# Patient Record
Sex: MALE | Race: WHITE | Employment: FULL TIME | URBAN - METROPOLITAN AREA
[De-identification: names, ages, dates, MRNs, and addresses within clinical notes are randomized per-mention and may not be internally consistent; named-entity substitution may affect disease eponyms.]

---

## 2018-08-28 ENCOUNTER — OFFICE VISIT (OUTPATIENT)
Dept: CARDIOLOGY CLINIC | Age: 58
End: 2018-08-28

## 2018-08-28 VITALS
HEART RATE: 60 BPM | HEIGHT: 67 IN | SYSTOLIC BLOOD PRESSURE: 106 MMHG | WEIGHT: 157 LBS | DIASTOLIC BLOOD PRESSURE: 64 MMHG | BODY MASS INDEX: 24.64 KG/M2 | OXYGEN SATURATION: 97 %

## 2018-08-28 DIAGNOSIS — I10 ESSENTIAL HYPERTENSION: ICD-10-CM

## 2018-08-28 DIAGNOSIS — I25.10 CORONARY ARTERY DISEASE INVOLVING NATIVE CORONARY ARTERY OF NATIVE HEART WITHOUT ANGINA PECTORIS: ICD-10-CM

## 2018-08-28 DIAGNOSIS — E78.2 MIXED HYPERLIPIDEMIA: ICD-10-CM

## 2018-08-28 PROCEDURE — 99204 OFFICE O/P NEW MOD 45 MIN: CPT | Performed by: INTERNAL MEDICINE

## 2018-08-28 RX ORDER — AMLODIPINE BESYLATE 5 MG/1
5 TABLET ORAL DAILY
Qty: 30 TABLET | Refills: 11 | Status: SHIPPED | OUTPATIENT
Start: 2018-08-28 | End: 2019-09-05 | Stop reason: SDUPTHER

## 2018-08-28 RX ORDER — LISINOPRIL 10 MG/1
10 TABLET ORAL DAILY
COMMUNITY
End: 2018-08-28 | Stop reason: SDUPTHER

## 2018-08-28 RX ORDER — CARVEDILOL 3.12 MG/1
3.12 TABLET ORAL 2 TIMES DAILY WITH MEALS
COMMUNITY
End: 2018-08-28 | Stop reason: SINTOL

## 2018-08-28 RX ORDER — OMEPRAZOLE 10 MG/1
10 CAPSULE, DELAYED RELEASE ORAL DAILY
COMMUNITY
End: 2019-10-11

## 2018-08-28 RX ORDER — CLOPIDOGREL BISULFATE 75 MG/1
75 TABLET ORAL DAILY
Qty: 30 TABLET | Refills: 11 | Status: SHIPPED | OUTPATIENT
Start: 2018-08-28 | End: 2019-08-09 | Stop reason: SDUPTHER

## 2018-08-28 RX ORDER — CLOPIDOGREL BISULFATE 75 MG/1
75 TABLET ORAL DAILY
COMMUNITY
End: 2018-08-28 | Stop reason: SDUPTHER

## 2018-08-28 RX ORDER — NITROGLYCERIN 0.4 MG/1
0.4 TABLET SUBLINGUAL EVERY 5 MIN PRN
COMMUNITY
End: 2021-01-04 | Stop reason: SDUPTHER

## 2018-08-28 RX ORDER — ATORVASTATIN CALCIUM 80 MG/1
80 TABLET, FILM COATED ORAL DAILY
COMMUNITY
End: 2018-08-28 | Stop reason: SDUPTHER

## 2018-08-28 RX ORDER — ATORVASTATIN CALCIUM 80 MG/1
80 TABLET, FILM COATED ORAL DAILY
Qty: 30 TABLET | Refills: 11 | Status: SHIPPED | OUTPATIENT
Start: 2018-08-28 | End: 2019-09-23 | Stop reason: SDUPTHER

## 2018-08-28 RX ORDER — LISINOPRIL 10 MG/1
10 TABLET ORAL DAILY
Qty: 30 TABLET | Refills: 11 | Status: SHIPPED | OUTPATIENT
Start: 2018-08-28 | End: 2019-09-05 | Stop reason: SDUPTHER

## 2018-08-28 RX ORDER — AMLODIPINE BESYLATE 5 MG/1
5 TABLET ORAL DAILY
COMMUNITY
End: 2018-08-28 | Stop reason: SDUPTHER

## 2018-08-28 NOTE — PROGRESS NOTES
Hypertension. Surgical History:   has a past surgical history that includes Mandible fracture surgery; Shoulder arthroscopy; Foot surgery; Diagnostic Cardiac Cath Lab Procedure; and Percutaneous Transluminal Coronary Angio. Social History:   He is , works full time installing granite counter tops, lives with spouse. He reports that he quit smoking about 5 weeks ago. His smoking use included Cigarettes. He has a 43.00 pack-year smoking history. He has never used smokeless tobacco. He reports that he drinks alcohol. He reports that he does not use drugs. Family History:  Mom MI CABG age 62 Brother MI stents age 63's  family history includes Heart Attack in his brother and mother. Home Medications:  Prior to Admission medications    Medication Sig Start Date End Date Taking? Authorizing Provider   amLODIPine (NORVASC) 5 MG tablet Take 5 mg by mouth daily   Yes Historical Provider, MD   aspirin 81 MG tablet Take 81 mg by mouth daily   Yes Historical Provider, MD   atorvastatin (LIPITOR) 80 MG tablet Take 80 mg by mouth daily   Yes Historical Provider, MD   carvedilol (COREG) 3.125 MG tablet Take 3.125 mg by mouth 2 times daily (with meals)   Yes Historical Provider, MD   clopidogrel (PLAVIX) 75 MG tablet Take 75 mg by mouth daily   Yes Historical Provider, MD   lisinopril (PRINIVIL;ZESTRIL) 10 MG tablet Take 10 mg by mouth daily   Yes Historical Provider, MD   nitroGLYCERIN (NITROSTAT) 0.4 MG SL tablet Place 0.4 mg under the tongue every 5 minutes as needed for Chest pain up to max of 3 total doses. If no relief after 1 dose, call 911. Yes Historical Provider, MD   omeprazole (PRILOSEC) 10 MG delayed release capsule Take 10 mg by mouth daily   Yes Historical Provider, MD        Allergies:  Codeine     Review of Systems:   · Constitutional: there has been no unanticipated weight loss. There's been no change in energy level, sleep pattern, or activity level.      · Eyes: No visual changes or diplopia. No scleral icterus. · ENT: No Headaches, hearing loss or vertigo. No mouth sores or sore throat. · Cardiovascular: Reviewed in HPI  · Respiratory: No cough or wheezing, no sputum production. No hematemesis. · Gastrointestinal: No abdominal pain, appetite loss, blood in stools. No change in bowel or bladder habits. · Genitourinary: No dysuria, trouble voiding, or hematuria. · Musculoskeletal:  No gait disturbance, weakness or joint complaints. · Integumentary: No rash or pruritis. · Neurological: No headache, diplopia, change in muscle strength, numbness or tingling. No change in gait, balance, coordination, mood, affect, memory, mentation, behavior. · Psychiatric: No anxiety, no depression. · Endocrine: No malaise, fatigue or temperature intolerance. No excessive thirst, fluid intake, or urination. No tremor. · Hematologic/Lymphatic: No abnormal bruising or bleeding, blood clots or swollen lymph nodes. · Allergic/Immunologic: No nasal congestion or hives. Physical Examination:    Vitals:    08/28/18 1348   BP: 106/64   Pulse: 60   SpO2: 97%        Constitutional and General Appearance: NAD   Respiratory:  · Normal excursion and expansion without use of accessory muscles  · Resp Auscultation: Normal breath sounds without dullness  Cardiovascular:  · The apical impulses not displaced  · Heart tones are crisp and normal  · Cervical veins are not engorged  · The carotid upstroke is normal in amplitude and contour without delay or bruit  · Normal S1S2, No S3, No Murmur  · Peripheral pulses are symmetrical and full  · There is no clubbing, cyanosis of the extremities.   · No edema  · Femoral Arteries: 2+ and equal  · Pedal Pulses: 2+ and equal   Abdomen:  · No masses or tenderness  · Liver/Spleen: No Abnormalities Noted  Neurological/Psychiatric:  · Alert and oriented in all spheres  · Moves all extremities well  · Exhibits normal gait balance and coordination  · No abnormalities of mood, affect, memory, mentation, or behavior are noted  Skin:  · Skin: warm and dry. Assessment:     1. Coronary artery disease involving native coronary artery of native heart without angina pectoris:  Most recent  [facility] 7/24/18   2-vessel CAD with proximal RCA 50-60% stenosis and OM2 with mid-vessel 75% stenosis. Mild multifocal proximal to mid LAD 25-30% plaquing but not severe. He had a successful HESHAM stent to the mid-OM2. Most recent ECHO 7/24/18 EF  55% to 60%. Normal diastolic function. Most recent EKG 7/24/18 SB 49 bpm inferior Q wave cannot rule out prior infarct. Mild NSTEMI with peak troponin 0.05.      2. Essential hypertension Stable and will continue present medical regimen. 3. Mixed hyperlipidemia Last lipids 7/24/18   HDL 57 . Suboptimal and started on high dose lipitor. Will recheck lipids end of September and adjust accordingly. Plan:  1. Will stop Coreg due to excessive fatigue which may be side effect of medication. Ideally, would like to use beta blocker but may not be able to tolerate. He is to call me in 2 weeks if not feeling better  2. Recheck lipids end of September  3. Referral to cardiac rehab patient refuses he is unable to attend due to work schedule  4. Continue all meds as directed refill made  5. Follow up in 3 months  6. Increase OTC Prilosec to 40 mg daily for GERD symptoms. If not better let me know. Cost of prescription medications and patient compliance have been reviewed with patient. All questions answered. Thank you for allowing me to participate in the care of this individual.    Sherie Martinez.  Mo Franco M.D., [facility]

## 2018-08-28 NOTE — LETTER
[facility] [address]  [address] [place] [id number]  Phone: [phone]  Fax: [phone]    Marisel Mar MD        August 28, 2018     Patient: Portillo Mejia   YOB: 1960   Date of Visit: 8/28/2018       To Whom It May Concern:    Edman Boas [place] today [place]. Kevin [place] [facility] [place] excuse for time missed. If you have any questions or concerns, please don't hesitate to call.     Sincerely,        Marisel Mar MD

## 2018-08-28 NOTE — COMMUNICATION BODY
[facility]   [facility]    Referring Provider:  SHAN Santos - CNP     Chief Complaint   Patient presents with    Follow Up After Procedure     [facility]    Coronary Artery Disease    Results     ekg 07/24/2018, echo 07/23/2018 - [facility]    Discuss Labs     07/24/2018 - [facility]    Other     wrist healing    Chest Pain    Fatigue     really tired last wk      Subjective:  Patient being seen today for new patient cardiology evaluation for CAD recent [facility] 7/24/18 s/p 1 stent, HTN, HLD; c/o fatigue and heartburn today    History of Present Illness:  Mr. Tobias Scott is a 63 yr old male self-referred to establish cardiac care after having OM#2 HESHAM placed in July 2018. He has PMH  for hypertension, HLD, newly dx CAD s/p mild NSTEMI and stent, and tobacco use. Mr Tobias Scott was seen at [facility] states that he woke up with ringing in his ears and mid-sternal CP radiating into neck region. Drove to [facility] ER and reports nitroglycerin SL and aspirin and reports relief of his pain. Troponin peak 0.05. Most recent LHC by Dr. Josy Forte on 7/24/18 2V CAD with proximal RCA 50-60% stenosis and OM2 with mid-vessel 75% stenosis; proximal mid-LAD 25-30% plaquing but not severe. He had a successful HESHAM stent to the mid-OM2. Most recent ECHO 7/24/18 EF  55% to 60%. Normal diastolic function. Most recent EKG 7/24/18 shows SB 49 bpm inferior Q wave cannot rule out prior infarct   Today he reports to feeling better but is very fatigued, lethargic, and no energy. He describes mid-burning CP after eating with belching and is taking prilosec. It's different than pain before stent. He quit smoking after cath. Wife, who is patient of mine, accompanied him today. Patient with no complaints of SOB, palpitations, dizziness, edema, or orthopnea/PND. Past Medical History:   has a past medical history of CAD (coronary artery disease);  Hyperlipidemia; and diplopia. No scleral icterus. · ENT: No Headaches, hearing loss or vertigo. No mouth sores or sore throat. · Cardiovascular: Reviewed in HPI  · Respiratory: No cough or wheezing, no sputum production. No hematemesis. · Gastrointestinal: No abdominal pain, appetite loss, blood in stools. No change in bowel or bladder habits. · Genitourinary: No dysuria, trouble voiding, or hematuria. · Musculoskeletal:  No gait disturbance, weakness or joint complaints. · Integumentary: No rash or pruritis. · Neurological: No headache, diplopia, change in muscle strength, numbness or tingling. No change in gait, balance, coordination, mood, affect, memory, mentation, behavior. · Psychiatric: No anxiety, no depression. · Endocrine: No malaise, fatigue or temperature intolerance. No excessive thirst, fluid intake, or urination. No tremor. · Hematologic/Lymphatic: No abnormal bruising or bleeding, blood clots or swollen lymph nodes. · Allergic/Immunologic: No nasal congestion or hives. Physical Examination:    Vitals:    08/28/18 1348   BP: 106/64   Pulse: 60   SpO2: 97%        Constitutional and General Appearance: NAD   Respiratory:  · Normal excursion and expansion without use of accessory muscles  · Resp Auscultation: Normal breath sounds without dullness  Cardiovascular:  · The apical impulses not displaced  · Heart tones are crisp and normal  · Cervical veins are not engorged  · The carotid upstroke is normal in amplitude and contour without delay or bruit  · Normal S1S2, No S3, No Murmur  · Peripheral pulses are symmetrical and full  · There is no clubbing, cyanosis of the extremities.   · No edema  · Femoral Arteries: 2+ and equal  · Pedal Pulses: 2+ and equal   Abdomen:  · No masses or tenderness  · Liver/Spleen: No Abnormalities Noted  Neurological/Psychiatric:  · Alert and oriented in all spheres  · Moves all extremities well  · Exhibits normal gait balance and coordination  · No abnormalities of mood, affect,

## 2018-10-15 ENCOUNTER — TELEPHONE (OUTPATIENT)
Dept: CARDIOLOGY CLINIC | Age: 58
End: 2018-10-15

## 2018-10-15 NOTE — TELEPHONE ENCOUNTER
Pt would like to have dosing of lipitor changed from 80 mg to 40 mg. Explained to Carlita Casillas. Once pt has his BW done that M ordered om 8.28.18 (last visit) he would then adjust accordingly. She will let Jared Erickson know once he is back in town.

## 2018-10-22 ENCOUNTER — HOSPITAL ENCOUNTER (OUTPATIENT)
Age: 58
Discharge: HOME | End: 2018-10-22
Payer: COMMERCIAL

## 2018-10-22 DIAGNOSIS — E78.2 MIXED HYPERLIPIDEMIA: ICD-10-CM

## 2018-10-22 LAB
CHOLESTEROL, TOTAL: 194 MG/DL (ref 0–199)
HDLC SERPL-MCNC: 58 MG/DL (ref 40–60)
LDL CHOLESTEROL CALCULATED: 103 MG/DL
TRIGL SERPL-MCNC: 167 MG/DL (ref 0–150)
VLDLC SERPL CALC-MCNC: 33 MG/DL

## 2018-10-22 PROCEDURE — 36415 COLL VENOUS BLD VENIPUNCTURE: CPT

## 2018-10-22 PROCEDURE — 80061 LIPID PANEL: CPT

## 2018-10-23 ENCOUNTER — TELEPHONE (OUTPATIENT)
Dept: CARDIOLOGY CLINIC | Age: 58
End: 2018-10-23

## 2018-10-23 NOTE — TELEPHONE ENCOUNTER
----- Message from Steve Santos MD sent at 10/23/2018  7:50 AM EDT -----  Lipids are much improved overall. WFI=024 and want < 100 closer to 70. CPM and watch diet as closely as possible. At next recheck may switch to stronger statin drug depending on results.

## 2019-02-26 RX ORDER — OMEPRAZOLE 20 MG/1
20 CAPSULE, DELAYED RELEASE ORAL 2 TIMES DAILY
Qty: 180 CAPSULE | Refills: 2 | OUTPATIENT
Start: 2019-02-26

## 2019-08-09 ENCOUNTER — TELEPHONE (OUTPATIENT)
Dept: CARDIOLOGY CLINIC | Age: 59
End: 2019-08-09

## 2019-08-09 RX ORDER — CLOPIDOGREL BISULFATE 75 MG/1
75 TABLET ORAL DAILY
Qty: 30 TABLET | Refills: 1 | Status: SHIPPED | OUTPATIENT
Start: 2019-08-09 | End: 2019-10-11 | Stop reason: SDUPTHER

## 2019-08-09 NOTE — TELEPHONE ENCOUNTER
Pt called to schedule. He will see SMM at [facility] on 10/11.  He would like med filled to last until then

## 2019-09-05 RX ORDER — LISINOPRIL 10 MG/1
10 TABLET ORAL DAILY
Qty: 90 TABLET | Refills: 0 | Status: SHIPPED | OUTPATIENT
Start: 2019-09-05 | End: 2019-10-11 | Stop reason: SDUPTHER

## 2019-09-05 RX ORDER — AMLODIPINE BESYLATE 5 MG/1
5 TABLET ORAL DAILY
Qty: 90 TABLET | Refills: 0 | Status: SHIPPED | OUTPATIENT
Start: 2019-09-05 | End: 2019-10-11 | Stop reason: SDUPTHER

## 2019-09-06 ENCOUNTER — TELEPHONE (OUTPATIENT)
Dept: CARDIOLOGY CLINIC | Age: 59
End: 2019-09-06

## 2019-09-23 RX ORDER — ATORVASTATIN CALCIUM 80 MG/1
80 TABLET, FILM COATED ORAL DAILY
Qty: 30 TABLET | Refills: 0 | Status: SHIPPED | OUTPATIENT
Start: 2019-09-23 | End: 2019-10-11 | Stop reason: SDUPTHER

## 2019-10-11 ENCOUNTER — OFFICE VISIT (OUTPATIENT)
Dept: CARDIOLOGY CLINIC | Age: 59
End: 2019-10-11
Payer: COMMERCIAL

## 2019-10-11 VITALS
SYSTOLIC BLOOD PRESSURE: 120 MMHG | DIASTOLIC BLOOD PRESSURE: 70 MMHG | HEART RATE: 61 BPM | WEIGHT: 172.5 LBS | BODY MASS INDEX: 27.02 KG/M2

## 2019-10-11 DIAGNOSIS — I10 ESSENTIAL HYPERTENSION: ICD-10-CM

## 2019-10-11 DIAGNOSIS — E78.2 MIXED HYPERLIPIDEMIA: Primary | ICD-10-CM

## 2019-10-11 DIAGNOSIS — I25.10 CORONARY ARTERY DISEASE INVOLVING NATIVE CORONARY ARTERY OF NATIVE HEART WITHOUT ANGINA PECTORIS: ICD-10-CM

## 2019-10-11 PROCEDURE — 99214 OFFICE O/P EST MOD 30 MIN: CPT | Performed by: INTERNAL MEDICINE

## 2019-10-11 RX ORDER — LISINOPRIL 10 MG/1
10 TABLET ORAL DAILY
Qty: 90 TABLET | Refills: 3 | Status: SHIPPED | OUTPATIENT
Start: 2019-10-11 | End: 2020-10-29

## 2019-10-11 RX ORDER — PANTOPRAZOLE SODIUM 40 MG/1
40 TABLET, DELAYED RELEASE ORAL DAILY
COMMUNITY

## 2019-10-11 RX ORDER — CLOPIDOGREL BISULFATE 75 MG/1
75 TABLET ORAL DAILY
Qty: 30 TABLET | Refills: 11 | Status: SHIPPED | OUTPATIENT
Start: 2019-10-11 | End: 2020-10-29

## 2019-10-11 RX ORDER — ATORVASTATIN CALCIUM 40 MG/1
80 TABLET, FILM COATED ORAL DAILY
Qty: 90 TABLET | Refills: 3 | Status: SHIPPED | OUTPATIENT
Start: 2019-10-11 | End: 2020-10-29

## 2019-10-11 RX ORDER — AMLODIPINE BESYLATE 5 MG/1
5 TABLET ORAL DAILY
Qty: 90 TABLET | Refills: 3 | Status: SHIPPED | OUTPATIENT
Start: 2019-10-11 | End: 2020-08-06

## 2019-10-29 ENCOUNTER — TELEPHONE (OUTPATIENT)
Dept: CARDIOLOGY CLINIC | Age: 59
End: 2019-10-29

## 2020-08-06 RX ORDER — AMLODIPINE BESYLATE 5 MG/1
5 TABLET ORAL DAILY
Qty: 90 TABLET | Refills: 0 | Status: SHIPPED | OUTPATIENT
Start: 2020-08-06 | End: 2020-10-29

## 2020-10-29 RX ORDER — LISINOPRIL 10 MG/1
10 TABLET ORAL DAILY
Qty: 90 TABLET | Refills: 0 | Status: SHIPPED | OUTPATIENT
Start: 2020-10-29 | End: 2020-12-08 | Stop reason: SDUPTHER

## 2020-10-29 RX ORDER — ATORVASTATIN CALCIUM 40 MG/1
TABLET, FILM COATED ORAL
Qty: 90 TABLET | Refills: 0 | Status: SHIPPED | OUTPATIENT
Start: 2020-10-29 | End: 2020-12-08 | Stop reason: SDUPTHER

## 2020-10-29 RX ORDER — CLOPIDOGREL BISULFATE 75 MG/1
75 TABLET ORAL DAILY
Qty: 90 TABLET | Refills: 0 | Status: SHIPPED | OUTPATIENT
Start: 2020-10-29 | End: 2020-12-08 | Stop reason: SDUPTHER

## 2020-10-29 RX ORDER — AMLODIPINE BESYLATE 5 MG/1
5 TABLET ORAL DAILY
Qty: 90 TABLET | Refills: 0 | Status: SHIPPED | OUTPATIENT
Start: 2020-10-29 | End: 2020-12-08 | Stop reason: SDUPTHER

## 2020-12-07 NOTE — PROGRESS NOTES
no change in energy level, sleep pattern, or activity level. · Eyes: No visual changes or diplopia. No scleral icterus. · ENT: No Headaches, hearing loss or vertigo. No mouth sores or sore throat. · Cardiovascular: Reviewed in HPI  · Respiratory: No cough or wheezing, no sputum production. No hematemesis. · Gastrointestinal: No abdominal pain, appetite loss, blood in stools. No change in bowel or bladder habits. · Genitourinary: No dysuria, trouble voiding, or hematuria. · Musculoskeletal:  No gait disturbance, weakness or joint complaints. · Integumentary: No rash or pruritis. · Neurological: No headache, diplopia, change in muscle strength, numbness or tingling. No change in gait, balance, coordination, mood, affect, memory, mentation, behavior. · Psychiatric: No anxiety, no depression. · Endocrine: No malaise, fatigue or temperature intolerance. No excessive thirst, fluid intake, or urination. No tremor. · Hematologic/Lymphatic: No abnormal bruising or bleeding, blood clots or swollen lymph nodes. · Allergic/Immunologic: No nasal congestion or hives. Physical Examination:    Vitals:    12/08/20 0955   BP: (!) 130/90   Pulse: 73   Temp:    SpO2: 98%      Wt Readings from Last 3 Encounters:   12/08/20 174 lb 12.8 oz (79.3 kg)   10/11/19 172 lb 8 oz (78.2 kg)   08/28/18 157 lb (71.2 kg)         Constitutional and General Appearance: NAD   Respiratory:  · Normal excursion and expansion without use of accessory muscles  · Resp Auscultation: Normal breath sounds without dullness  Cardiovascular:  · The apical impulses not displaced  · Heart tones are crisp and normal  · Cervical veins are not engorged  · The carotid upstroke is normal in amplitude and contour without delay or bruit  · Normal S1S2, No S3, No Murmur  · Peripheral pulses are symmetrical and full  · There is no clubbing, cyanosis of the extremities.   · No edema  · Femoral Arteries: 2+ and equal  · Pedal Pulses: 2+ and equal Abdomen:  · No masses or tenderness  · Liver/Spleen: No Abnormalities Noted  Neurological/Psychiatric:  · Alert and oriented in all spheres  · Moves all extremities well  · Exhibits normal gait balance and coordination  · No abnormalities of mood, affect, memory, mentation, or behavior are noted  Skin:  · Skin: warm and dry. Lab Results   Component Value Date    CHOL 194 10/22/2018     Lab Results   Component Value Date    TRIG 167 (H) 10/22/2018     Lab Results   Component Value Date    HDL 58 10/22/2018     Lab Results   Component Value Date    LDLCALC 103 (H) 10/22/2018     Lab Results   Component Value Date    LABVLDL 33 10/22/2018     Assessment:     1. Coronary artery disease involving native coronary artery of native heart without angina pectoris:  Most recent  [facility] 7/24/18 showed 2V CAD with 1 requiring PCI s/p successful HESHAM stent to the mid-OM2. Most recent ECHO 7/24/18 EF=55-60%. Normal diastolic function. Most recent EKG 7/24/18 SB 49 bpm inferior Q wave cannot rule out prior infarct. Mild NSTEMI in 2018. Now with new c/o mid-CP x 3-4 days. I am concerned for possible angina given cardiac history and new symptoms. No cardiac testing since NSTEMI and stent > 2 years ago. I believe he merits non-invasive cardiac evaluation to assess for ischemia. Will start anti-anginal imdur. 2. Essential hypertension Stable and will continue present medical regimen. 3. Mixed hyperlipidemia Last lipids 10/22/18. Suboptimal and started high dose 80mg lipitor. He had GI upset and decreased to 40mg daily. He has not gotten labs drawn in 2 years and I strongly encouraged him to do so. He is fasting today and can get done now. Plan:  1. Meds reviewed. 4 refills sent  2. He will continue plavix since he has to take NSAID and less interaction with plavix versus aspirin. I told him to minimize NSAID use as much as possible. 3. Will check fasting cmp, lipids, cbc  4.  I will order a GXT nuclear stress test to assess myocardial perfusion and LV function. 5. Will add Imdur 30 mg 1 daily long acting nitroglycerin to prevent chest pain in people with coronary artery disease can cause headaches call if problematic  6. EKG today shows NSR 67bpm (no sig change from 7/18 EKG). 7. Follow up in 6 months    Cost of prescription medications and patient compliance have been reviewed with patient. All questions answered. Thank you for allowing me to participate in the care of this individual.     This note was scribed in the presence of Wale Woodard MD by Allie Hughes RN    I, Dr. Ashley Rachel, personally performed the services described in this documentation, as scribed by the above signed scribe in my presence. It is both accurate and complete to my knowledge. I agree with the details independently gathered by the clinical support staff, while the remaining scribed note accurately describes my personal service to the patient. Eduardo Clarke.  Sameer Musa M.D., [facility] [place]

## 2020-12-08 ENCOUNTER — OFFICE VISIT (OUTPATIENT)
Dept: CARDIOLOGY CLINIC | Age: 60
End: 2020-12-08
Payer: COMMERCIAL

## 2020-12-08 VITALS
DIASTOLIC BLOOD PRESSURE: 90 MMHG | TEMPERATURE: 98.3 F | BODY MASS INDEX: 27.44 KG/M2 | OXYGEN SATURATION: 98 % | SYSTOLIC BLOOD PRESSURE: 130 MMHG | WEIGHT: 174.8 LBS | HEIGHT: 67 IN | HEART RATE: 73 BPM

## 2020-12-08 PROBLEM — Z87.891 HISTORY OF TOBACCO ABUSE: Status: ACTIVE | Noted: 2020-12-08

## 2020-12-08 PROCEDURE — 93000 ELECTROCARDIOGRAM COMPLETE: CPT | Performed by: INTERNAL MEDICINE

## 2020-12-08 PROCEDURE — 99214 OFFICE O/P EST MOD 30 MIN: CPT | Performed by: INTERNAL MEDICINE

## 2020-12-08 RX ORDER — CLOPIDOGREL BISULFATE 75 MG/1
75 TABLET ORAL DAILY
Qty: 90 TABLET | Refills: 3 | Status: SHIPPED | OUTPATIENT
Start: 2020-12-08 | End: 2021-12-21

## 2020-12-08 RX ORDER — ISOSORBIDE MONONITRATE 30 MG/1
30 TABLET, EXTENDED RELEASE ORAL DAILY
Qty: 30 TABLET | Refills: 5 | Status: SHIPPED | OUTPATIENT
Start: 2020-12-08 | End: 2021-06-11 | Stop reason: SINTOL

## 2020-12-08 RX ORDER — AMLODIPINE BESYLATE 5 MG/1
5 TABLET ORAL DAILY
Qty: 90 TABLET | Refills: 3 | Status: SHIPPED | OUTPATIENT
Start: 2020-12-08 | End: 2021-12-21

## 2020-12-08 RX ORDER — LISINOPRIL 10 MG/1
10 TABLET ORAL DAILY
Qty: 90 TABLET | Refills: 3 | Status: SHIPPED | OUTPATIENT
Start: 2020-12-08 | End: 2021-12-21

## 2020-12-08 RX ORDER — ATORVASTATIN CALCIUM 40 MG/1
40 TABLET, FILM COATED ORAL DAILY
Qty: 90 TABLET | Refills: 3 | Status: SHIPPED | OUTPATIENT
Start: 2020-12-08 | End: 2021-12-21

## 2020-12-08 NOTE — PATIENT INSTRUCTIONS
Plan:  1. Meds reviewed. 4 refills sent  2. Now > 1 year on DAPT post-stent so I will stop taking baby aspirin and continue plavix since he has to take NSAID and less interaction with plavix versus aspirin. I told him to minimize NSAID use as much as possible. 3. Will check fasting cmp, lipids, cbc  4. Will check Myoview GXT stress test  5. Will add Imdur 30 mg 1 daily long acting nitroglycerin to prevent chest pain in people with coronary artery disease can cause headaches call if problematic  6.  Follow up in 6 months

## 2020-12-31 NOTE — TELEPHONE ENCOUNTER
Pt called stating he was informed by central scheduling that his out of pocket expense for his stress test will be over $1000.  Pt stated he cannot afford this and is going to cancel the test.

## 2021-01-04 NOTE — TELEPHONE ENCOUNTER
OK. If doing well on imdur 30mg po daily then would just continue medical management. If he gets CP not relieved with 3 SL NTG and persistent pain x 15 minutes then he needs to chew 4 baby aspirin or 1 adult aspirin and call 911 and come to ER for evaluation. If he does not have PRN SL NTG 0.4mg prescription please give him one. Thanks.

## 2021-01-04 NOTE — TELEPHONE ENCOUNTER
Called pt to relay message and he sts that he quit taking the Imdur 30mg qd a few weeks ago because it was causing him to have bad headaches and since stopping the medication the headaches have dissipated. Please advise on if there is another alternative to the Imdur. Pt VU about the SL NTG and aspirin dosing for CP, pt will need a new script for the SL NTG.  TY

## 2021-01-04 NOTE — TELEPHONE ENCOUNTER
If he is still having CP then I would start ranexa 500mg po BID to help with anti-anginal relief. If he does not have script for 0.4mg SL NTG then please prescribe one. Please explain he can take 1 tablet every 5 minutes up to 3 tablets. If still having CP after 3 tablets and 15 minutes then chew 4 baby aspirin or 1 adult strength aspirin and call 911 to get to hospital. Thanks.

## 2021-01-05 RX ORDER — RANOLAZINE 500 MG/1
500 TABLET, EXTENDED RELEASE ORAL 2 TIMES DAILY
Qty: 60 TABLET | Refills: 5 | Status: SHIPPED | OUTPATIENT
Start: 2021-01-05 | End: 2021-11-23 | Stop reason: ALTCHOICE

## 2021-01-05 RX ORDER — NITROGLYCERIN 0.4 MG/1
0.4 TABLET SUBLINGUAL EVERY 5 MIN PRN
Qty: 25 TABLET | Refills: 5 | Status: SHIPPED | OUTPATIENT
Start: 2021-01-05

## 2021-04-14 ENCOUNTER — HOSPITAL ENCOUNTER (OUTPATIENT)
Age: 61
Discharge: HOME | End: 2021-04-14
Payer: COMMERCIAL

## 2021-04-14 ENCOUNTER — HOSPITAL ENCOUNTER (OUTPATIENT)
Dept: GENERAL RADIOLOGY | Age: 61
Discharge: HOME | End: 2021-04-14
Payer: COMMERCIAL

## 2021-04-14 DIAGNOSIS — R06.89 DYSPNEA AND RESPIRATORY ABNORMALITY: ICD-10-CM

## 2021-04-14 DIAGNOSIS — R06.00 DYSPNEA AND RESPIRATORY ABNORMALITY: ICD-10-CM

## 2021-04-14 PROCEDURE — 71046 X-RAY EXAM CHEST 2 VIEWS: CPT

## 2021-04-28 ENCOUNTER — HOSPITAL ENCOUNTER (OUTPATIENT)
Dept: CT IMAGING | Age: 61
Discharge: HOME | End: 2021-04-28
Payer: COMMERCIAL

## 2021-04-28 DIAGNOSIS — S39.001A INJURY OF MUSCLE OF ABDOMEN: ICD-10-CM

## 2021-04-28 PROCEDURE — 74176 CT ABD & PELVIS W/O CONTRAST: CPT

## 2021-05-06 ENCOUNTER — TELEPHONE (OUTPATIENT)
Dept: PULMONOLOGY | Age: 61
End: 2021-05-06

## 2021-05-06 NOTE — TELEPHONE ENCOUNTER
Called [facility] they are routing message regarding referral and will return call to office. MD Flor Dunlap; Gerardo Flanagan MA             Please check to see if this referral is in reference to the April Abdominal CT scan or if there is a chest CT  If chest CT, then I need that report before deciding on timing.

## 2021-06-02 ENCOUNTER — OFFICE VISIT (OUTPATIENT)
Dept: PULMONOLOGY | Age: 61
End: 2021-06-02
Payer: COMMERCIAL

## 2021-06-02 VITALS
RESPIRATION RATE: 16 BRPM | DIASTOLIC BLOOD PRESSURE: 80 MMHG | HEART RATE: 60 BPM | OXYGEN SATURATION: 96 % | HEIGHT: 67 IN | WEIGHT: 173 LBS | SYSTOLIC BLOOD PRESSURE: 112 MMHG | BODY MASS INDEX: 27.15 KG/M2

## 2021-06-02 DIAGNOSIS — R93.89 ABNORMAL CT OF THE CHEST: Primary | ICD-10-CM

## 2021-06-02 PROCEDURE — 99243 OFF/OP CNSLTJ NEW/EST LOW 30: CPT | Performed by: INTERNAL MEDICINE

## 2021-06-02 NOTE — PROGRESS NOTES
PULMONARY, CRITICAL CARE AND SLEEP MEDICINE     CC/REFERRING PROVIDER:  Patient is being seen at the request of Margretta Collet for a consultation for abnormal CT     PRESENTING HPI: This is a 62-year-old male who was being evaluated for hernia with an abdominal CT on 4/28/2021 at [facility]) which showed scarring and resolving parenchymal lung disease in the lower lungs. We are asked to evaluate and comment on the same. Past Medical History:   Diagnosis Date    CAD (coronary artery disease)     Hyperlipidemia     Hypertension        Past Surgical History:   Procedure Laterality Date    DIAGNOSTIC CARDIAC CATH LAB PROCEDURE  07/24/2018    FOOT SURGERY      MANDIBLE FRACTURE SURGERY      PTCA  07/24/2018    SHOULDER ARTHROSCOPY      R shoulder and rotator cuff       Allergies   Allergen Reactions    Coreg [Carvedilol]      Fatigue     Codeine Palpitations       Medication list was reviewed and updated as needed in Epic     reports that he quit smoking about 2 years ago. His smoking use included cigarettes. He has a 43.00 pack-year smoking history. He has never used smokeless tobacco.    family history includes Heart Attack in his brother and mother. REVIEW OF SYSTEMS: Complete Review of system reviewed with patient and noted on attached review of system sheet. PHYSICAL EXAM:  Blood pressure 112/80, pulse 60, resp. rate 16, height 5' 7\" (1.702 m), weight 173 lb (78.5 kg), SpO2 96 %.'  CONSTITUTIONAL:  No acute distress. HENT:  Oropharynx is clear and moist. No thyromegaly. EYES:  Conjunctivae are normal. Pupils equal, round, and reactive to light. No scleral icterus. NECK:  No tracheal deviation present. No obvious thyroid mass. CV: Normal rate, regular rhythm, normal heart sounds. No right ventricular heave. No lower extremity edema. PULM/CHEST: No wheezes. No rales. Chest wall is not dull to percussion. No accessory muscle usage or stridor. ABDOMINAL: Soft. Bowel sounds present.  No distension or hernia. No tenderness. MUSCULOSKELETAL: No cyanosis. No clubbing. No obvious joint deformity. LYMPHATIC: No cervical or supraclavicular adenopathy. SKIN: Skin is warm and dry. No rash or nodules on the exposed extremities. PSYCHIATRIC: Normal mood and affect. Behavior is normal.  No anxiety. NEUROLOGIC: Alert, awake and oriented. PERRL. Speech fluent    DATA:  Abdominal CT 4/28/2021 personally reviewed lung windows  FINDINGS:   Lower Chest: There are chronic linear densities within the lateral left lung   base which may represent minimal chronic atelectasis/scar. Gael Raja is minimal   parenchymal disease/atelectasis at the right lung base.  In the lower lobe,   finding is most pronounced posteromedially (image 22). ASSESSMENT:  · Abnormal CT imaging of the lower chest is consistent with resolving COVID pneumonia  · 40-pack-year tobacco in remission    PLAN:  · Based upon my review of the imaging, no additional CT imaging is required. However, low-dose CT imaging of the chest for lung cancer screening is indicated in this gentleman due to his tobacco history. We discussed the risks benefits and alternatives related to my recommendation for lung cancer screening, and the patient declined. Should he change his mind, he can schedule this through our office or with his PCP. · A regular aerobic exercise program was recommended. · Routine follow-up can be with Mr. Jayesh Wood.

## 2021-06-08 NOTE — PROGRESS NOTES
[address]   Cardiac Follow Up    Referring Provider:  SHAN Nelson CNP     Chief Complaint   Patient presents with    6 Month Follow-Up    Coronary Artery Disease    Hyperlipidemia    Other     No new complaints      Subjective:  Patient being seen today for routine f/u CAD with hx C 7/24/18 s/p 1 stent, HTN, HLD; no complaints today    Past Medical History:   Mr. Ori Denton is a 65yr old male self-referred and established cardiac care with me 8/28/18 after having OM#2 HESHAM placed in July 2018. He has PMH of HTN, HLD, CAD s/p mild NSTEMI and OM#2 stent 7/18, and prior tobacco use (quit 2018 after NSTEMI). Mr Ori Denton woke up with mid-sternal CP radiating into neck region. Drove to [facility] ER and took NST SL and aspirin and got relief of his pain. Troponin peak 0.05. Most recent C by Dr. Damien Darden on 7/24/18 2V CAD with proximal RCA 50-60% stenosis and OM2 with mid-vessel 75% stenosis; proximal mid-LAD 25-30% plaquing but not severe. He had a successful HESHAM stent to the mid-OM2. Most recent ECHO 7/24/18 EF=55-60%. Normal diastolic function. Most recent EKG 12/8/20 NSR 67bpm (7/24/18 SB)    History of Present Illness: Today he reports feeling good. Chest pain has resolved. Last OV I ordered nuc stress test but he did not test due to cost. Note HA with imdur and stopped on his own. He had labs done recently with PCP. We do not have access to these. He is taking his medications as prescribed. Patient with no complaints of chest pain, SOB, palpitations, dizziness, edema, or orthopnea/PND. He does NOT want to take Covid vaccine. Past Medical History:   has a past medical history of CAD (coronary artery disease), Hyperlipidemia, and Hypertension. Surgical History:   has a past surgical history that includes Mandible fracture surgery; Shoulder arthroscopy; Foot surgery; Diagnostic Cardiac Cath Lab Procedure (07/24/2018); and Percutaneous Transluminal Coronary Angio (07/24/2018). Social History:   He is , works full time installing granite counter tops, lives with spouse. He reports that he quit smoking 2018. His smoking use included Cigarettes. He has a 43.00 pack-year smoking history. He has never used smokeless tobacco. He reports that he drinks alcohol. He reports that he does not use drugs. Family History:  Mom MI CABG age 62 Brother MI stents age 63's Dad no heart dz  family history includes Heart Attack in his brother and mother. Home Medications:  Prior to Admission medications    Medication Sig Start Date End Date Taking? Authorizing Provider   ranolazine (RANEXA) 500 MG extended release tablet Take 1 tablet by mouth 2 times daily 1/5/21  Yes Samina Call MD   nitroGLYCERIN (NITROSTAT) 0.4 MG SL tablet Place 1 tablet under the tongue every 5 minutes as needed for Chest pain up to max of 3 total doses. If no relief after 1 dose, call 911. 1/5/21  Yes Samina Call MD   amLODIPine [facility]) 5 MG tablet Take 1 tablet by mouth daily 12/8/20  Yes Samina Call MD   clopidogrel (PLAVIX) 75 MG tablet Take 1 tablet by mouth daily 12/8/20  Yes Samina Call MD   atorvastatin (LIPITOR) 40 MG tablet Take 1 tablet by mouth daily 12/8/20  Yes Samina Call MD   lisinopril (PRINIVIL;ZESTRIL) 10 MG tablet Take 1 tablet by mouth daily 12/8/20  Yes Samina Call MD   isosorbide mononitrate (IMDUR) 30 MG extended release tablet Take 1 tablet by mouth daily 12/8/20  Yes Samina Call MD   pantoprazole (PROTONIX) 40 MG tablet Take 40 mg by mouth daily   Yes Historical Provider, MD        Allergies:  Coreg [carvedilol] and Codeine     Review of Systems:   · Constitutional: there has been no unanticipated weight loss. There's been no change in energy level, sleep pattern, or activity level. · Eyes: No visual changes or diplopia. No scleral icterus. · ENT: No Headaches, hearing loss or vertigo. No mouth sores or sore throat.   · Cardiovascular: Reviewed in HPI  · Respiratory: No cough or wheezing, no sputum production. No hematemesis. · Gastrointestinal: No abdominal pain, appetite loss, blood in stools. No change in bowel or bladder habits. · Genitourinary: No dysuria, trouble voiding, or hematuria. · Musculoskeletal:  No gait disturbance, weakness or joint complaints. · Integumentary: No rash or pruritis. · Neurological: No headache, diplopia, change in muscle strength, numbness or tingling. No change in gait, balance, coordination, mood, affect, memory, mentation, behavior. · Psychiatric: No anxiety, no depression. · Endocrine: No malaise, fatigue or temperature intolerance. No excessive thirst, fluid intake, or urination. No tremor. · Hematologic/Lymphatic: No abnormal bruising or bleeding, blood clots or swollen lymph nodes. · Allergic/Immunologic: No nasal congestion or hives. Physical Examination:    Vitals:    06/11/21 0912   BP: 132/80   Pulse: 63   Temp: 98.2 °F (36.8 °C)   SpO2: 97%      Wt Readings from Last 3 Encounters:   06/11/21 174 lb 9.6 oz (79.2 kg)   06/02/21 173 lb (78.5 kg)   12/08/20 174 lb 12.8 oz (79.3 kg)         Constitutional and General Appearance: NAD   Respiratory:  · Normal excursion and expansion without use of accessory muscles  · Resp Auscultation: Normal breath sounds without dullness Clear no crackles or wheezes. Cardiovascular:  · The apical impulses not displaced  · Heart tones are crisp and normal  · Cervical veins are not engorged  · The carotid upstroke is normal in amplitude and contour without delay or bruit  · Normal S1S2, No S3, No Murmur  · Peripheral pulses are symmetrical and full  · There is no clubbing, cyanosis of the extremities.   · No edema  · Femoral Arteries: 2+ and equal  · Pedal Pulses: 2+ and equal   Abdomen:  · No masses or tenderness  · Liver/Spleen: No Abnormalities Noted  Neurological/Psychiatric:  · Alert and oriented in all spheres  · Moves all extremities well  · Exhibits normal gait balance and coordination  · No abnormalities of mood, affect, memory, mentation, or behavior are noted  Skin:  · Skin: warm and dry. Lab Results   Component Value Date    CHOL 194 10/22/2018     Lab Results   Component Value Date    TRIG 167 (H) 10/22/2018     Lab Results   Component Value Date    HDL 58 10/22/2018     Lab Results   Component Value Date    LDLCALC 103 (H) 10/22/2018     Lab Results   Component Value Date    LABVLDL 33 10/22/2018     Assessment:     1. Coronary artery disease involving native coronary artery of native heart without angina pectoris:  Most recent  LHC 7/24/18 showed 2V CAD with 1 requiring PCI s/p successful HESHAM stent to the mid-OM2. Most recent ECHO 7/24/18 EF=55-60%. Normal diastolic function. Mild NSTEMI in 2018. There are no concerning symptoms for angina currently. 2. Essential hypertension Stable and will continue present medical regimen. 3. Mixed hyperlipidemia Last lipids 10/22/18. Suboptimal and started high dose 80mg lipitor. He had GI upset and decreased to 40mg daily. He has not gotten labs drawn in 2 years and I strongly encouraged him to do so. PCP follows and will adjust.      Plan:  1. Continue current course. No refills warranted. 2. Follow up in one year    This note was scribed in the presence of Marcelle Kay MD by Darryn Gómez RN. I, Dr. Marcelle Kay, personally performed the services described in this documentation, as scribed by the above signed scribe in my presence. It is both accurate and complete to my knowledge. I agree with the details independently gathered by the clinical support staff, while the remaining scribed note accurately describes my personal service to the patient. Cost of prescription medications and patient compliance have been reviewed with patient. All questions answered. Thank you for allowing me to participate in the care of this individual.    Heidi Triana.  Soni Maher M.D., [facility] [place]

## 2021-06-11 ENCOUNTER — OFFICE VISIT (OUTPATIENT)
Dept: CARDIOLOGY CLINIC | Age: 61
End: 2021-06-11
Payer: COMMERCIAL

## 2021-06-11 VITALS
SYSTOLIC BLOOD PRESSURE: 132 MMHG | HEIGHT: 67 IN | DIASTOLIC BLOOD PRESSURE: 80 MMHG | OXYGEN SATURATION: 97 % | WEIGHT: 174.6 LBS | TEMPERATURE: 98.2 F | BODY MASS INDEX: 27.4 KG/M2 | HEART RATE: 63 BPM

## 2021-06-11 DIAGNOSIS — I25.10 CORONARY ARTERY DISEASE INVOLVING NATIVE CORONARY ARTERY OF NATIVE HEART WITHOUT ANGINA PECTORIS: Primary | ICD-10-CM

## 2021-06-11 DIAGNOSIS — Z87.891 HISTORY OF TOBACCO ABUSE: ICD-10-CM

## 2021-06-11 DIAGNOSIS — I10 ESSENTIAL HYPERTENSION: ICD-10-CM

## 2021-06-11 DIAGNOSIS — E78.2 MIXED HYPERLIPIDEMIA: ICD-10-CM

## 2021-06-11 PROCEDURE — 99214 OFFICE O/P EST MOD 30 MIN: CPT | Performed by: INTERNAL MEDICINE

## 2021-11-10 ENCOUNTER — APPOINTMENT (OUTPATIENT)
Dept: GENERAL RADIOLOGY | Age: 61
End: 2021-11-10
Payer: COMMERCIAL

## 2021-11-10 ENCOUNTER — HOSPITAL ENCOUNTER (EMERGENCY)
Age: 61
Discharge: LEFT BEFORE DISCHARGE | End: 2021-11-10
Attending: EMERGENCY MEDICINE
Payer: COMMERCIAL

## 2021-11-10 VITALS
HEART RATE: 55 BPM | SYSTOLIC BLOOD PRESSURE: 139 MMHG | DIASTOLIC BLOOD PRESSURE: 95 MMHG | OXYGEN SATURATION: 94 % | RESPIRATION RATE: 17 BRPM | BODY MASS INDEX: 26.68 KG/M2 | HEIGHT: 67 IN | TEMPERATURE: 97.6 F | WEIGHT: 170 LBS

## 2021-11-10 DIAGNOSIS — R07.9 CHEST PAIN, UNSPECIFIED TYPE: Primary | ICD-10-CM

## 2021-11-10 LAB
A/G RATIO: 1.3 (ref 1.1–2.2)
ALBUMIN SERPL-MCNC: 4.6 G/DL (ref 3.4–5)
ALP BLD-CCNC: 82 U/L (ref 40–129)
ALT SERPL-CCNC: 51 U/L (ref 10–40)
ANION GAP SERPL CALCULATED.3IONS-SCNC: 13 MMOL/L (ref 3–16)
AST SERPL-CCNC: 34 U/L (ref 15–37)
BASOPHILS ABSOLUTE: 0 K/UL (ref 0–0.2)
BASOPHILS RELATIVE PERCENT: 0.8 %
BILIRUB SERPL-MCNC: 0.5 MG/DL (ref 0–1)
BUN BLDV-MCNC: 17 MG/DL (ref 7–20)
CALCIUM SERPL-MCNC: 9.8 MG/DL (ref 8.3–10.6)
CHLORIDE BLD-SCNC: 99 MMOL/L (ref 99–110)
CO2: 26 MMOL/L (ref 21–32)
CREAT SERPL-MCNC: 1 MG/DL (ref 0.8–1.3)
EKG ATRIAL RATE: 64 BPM
EKG DIAGNOSIS: NORMAL
EKG P AXIS: 48 DEGREES
EKG P-R INTERVAL: 154 MS
EKG Q-T INTERVAL: 404 MS
EKG QRS DURATION: 98 MS
EKG QTC CALCULATION (BAZETT): 416 MS
EKG R AXIS: 62 DEGREES
EKG T AXIS: 62 DEGREES
EKG VENTRICULAR RATE: 64 BPM
EOSINOPHILS ABSOLUTE: 0.1 K/UL (ref 0–0.6)
EOSINOPHILS RELATIVE PERCENT: 2.8 %
GFR AFRICAN AMERICAN: >60
GFR NON-AFRICAN AMERICAN: >60
GLUCOSE BLD-MCNC: 109 MG/DL (ref 70–99)
HCT VFR BLD CALC: 42 % (ref 40.5–52.5)
HEMOGLOBIN: 14.6 G/DL (ref 13.5–17.5)
LYMPHOCYTES ABSOLUTE: 1.1 K/UL (ref 1–5.1)
LYMPHOCYTES RELATIVE PERCENT: 23.3 %
MCH RBC QN AUTO: 30 PG (ref 26–34)
MCHC RBC AUTO-ENTMCNC: 34.6 G/DL (ref 31–36)
MCV RBC AUTO: 86.5 FL (ref 80–100)
MONOCYTES ABSOLUTE: 0.3 K/UL (ref 0–1.3)
MONOCYTES RELATIVE PERCENT: 7.1 %
NEUTROPHILS ABSOLUTE: 3.2 K/UL (ref 1.7–7.7)
NEUTROPHILS RELATIVE PERCENT: 66 %
PDW BLD-RTO: 14 % (ref 12.4–15.4)
PLATELET # BLD: 196 K/UL (ref 135–450)
PMV BLD AUTO: 9 FL (ref 5–10.5)
POTASSIUM SERPL-SCNC: 4.1 MMOL/L (ref 3.5–5.1)
RBC # BLD: 4.86 M/UL (ref 4.2–5.9)
SODIUM BLD-SCNC: 138 MMOL/L (ref 136–145)
TOTAL PROTEIN: 8.2 G/DL (ref 6.4–8.2)
TROPONIN: <0.01 NG/ML
WBC # BLD: 4.9 K/UL (ref 4–11)

## 2021-11-10 PROCEDURE — 85025 COMPLETE CBC W/AUTO DIFF WBC: CPT

## 2021-11-10 PROCEDURE — 80053 COMPREHEN METABOLIC PANEL: CPT

## 2021-11-10 PROCEDURE — 71046 X-RAY EXAM CHEST 2 VIEWS: CPT

## 2021-11-10 PROCEDURE — G0378 HOSPITAL OBSERVATION PER HR: HCPCS

## 2021-11-10 PROCEDURE — 99284 EMERGENCY DEPT VISIT MOD MDM: CPT

## 2021-11-10 PROCEDURE — 6370000000 HC RX 637 (ALT 250 FOR IP): Performed by: EMERGENCY MEDICINE

## 2021-11-10 PROCEDURE — 93010 ELECTROCARDIOGRAM REPORT: CPT | Performed by: INTERNAL MEDICINE

## 2021-11-10 PROCEDURE — 93005 ELECTROCARDIOGRAM TRACING: CPT | Performed by: EMERGENCY MEDICINE

## 2021-11-10 PROCEDURE — 84484 ASSAY OF TROPONIN QUANT: CPT

## 2021-11-10 RX ORDER — ASPIRIN 81 MG/1
324 TABLET, CHEWABLE ORAL ONCE
Status: COMPLETED | OUTPATIENT
Start: 2021-11-10 | End: 2021-11-10

## 2021-11-10 RX ORDER — SODIUM CHLORIDE 0.9 % (FLUSH) 0.9 %
5-40 SYRINGE (ML) INJECTION PRN
Status: CANCELLED | OUTPATIENT
Start: 2021-11-10

## 2021-11-10 RX ORDER — ACETAMINOPHEN 650 MG/1
650 SUPPOSITORY RECTAL EVERY 6 HOURS PRN
Status: CANCELLED | OUTPATIENT
Start: 2021-11-10

## 2021-11-10 RX ORDER — SODIUM CHLORIDE 0.9 % (FLUSH) 0.9 %
5-40 SYRINGE (ML) INJECTION EVERY 12 HOURS SCHEDULED
Status: CANCELLED | OUTPATIENT
Start: 2021-11-10

## 2021-11-10 RX ORDER — AMLODIPINE BESYLATE 5 MG/1
5 TABLET ORAL DAILY
Status: CANCELLED | OUTPATIENT
Start: 2021-11-10

## 2021-11-10 RX ORDER — ASPIRIN 81 MG/1
81 TABLET, CHEWABLE ORAL DAILY
Status: CANCELLED | OUTPATIENT
Start: 2021-11-11

## 2021-11-10 RX ORDER — PANTOPRAZOLE SODIUM 40 MG/1
40 TABLET, DELAYED RELEASE ORAL DAILY
Status: CANCELLED | OUTPATIENT
Start: 2021-11-10

## 2021-11-10 RX ORDER — RANOLAZINE 500 MG/1
500 TABLET, EXTENDED RELEASE ORAL 2 TIMES DAILY
Status: CANCELLED | OUTPATIENT
Start: 2021-11-10

## 2021-11-10 RX ORDER — ACETAMINOPHEN 325 MG/1
650 TABLET ORAL EVERY 6 HOURS PRN
Status: CANCELLED | OUTPATIENT
Start: 2021-11-10

## 2021-11-10 RX ORDER — CLOPIDOGREL BISULFATE 75 MG/1
75 TABLET ORAL DAILY
Status: CANCELLED | OUTPATIENT
Start: 2021-11-10

## 2021-11-10 RX ORDER — LISINOPRIL 10 MG/1
10 TABLET ORAL DAILY
Status: CANCELLED | OUTPATIENT
Start: 2021-11-10

## 2021-11-10 RX ORDER — SODIUM CHLORIDE 9 MG/ML
25 INJECTION, SOLUTION INTRAVENOUS PRN
Status: CANCELLED | OUTPATIENT
Start: 2021-11-10

## 2021-11-10 RX ORDER — ATORVASTATIN CALCIUM 40 MG/1
40 TABLET, FILM COATED ORAL DAILY
Status: CANCELLED | OUTPATIENT
Start: 2021-11-10

## 2021-11-10 RX ORDER — ACETAMINOPHEN 325 MG/1
650 TABLET ORAL ONCE
Status: DISCONTINUED | OUTPATIENT
Start: 2021-11-10 | End: 2021-11-10 | Stop reason: HOSPADM

## 2021-11-10 RX ADMIN — ASPIRIN 324 MG: 81 TABLET, CHEWABLE ORAL at 09:33

## 2021-11-10 RX ADMIN — NITROGLYCERIN 0.5 INCH: 20 OINTMENT TOPICAL at 09:33

## 2021-11-10 ASSESSMENT — PAIN DESCRIPTION - LOCATION: LOCATION: CHEST

## 2021-11-10 ASSESSMENT — HEART SCORE: ECG: 0

## 2021-11-10 ASSESSMENT — PAIN DESCRIPTION - DESCRIPTORS: DESCRIPTORS: PRESSURE

## 2021-11-10 ASSESSMENT — PAIN SCALES - GENERAL
PAINLEVEL_OUTOF10: 3
PAINLEVEL_OUTOF10: 1

## 2021-11-10 ASSESSMENT — PAIN DESCRIPTION - PAIN TYPE: TYPE: ACUTE PAIN

## 2021-11-10 NOTE — H&P
Hospital Medicine History & Physical      PCP: SHAN Hedrick CNP    Date of Admission: 11/10/2021    Date of Service: Pt seen/examined on 11/10  and Admitted to Inpatient with expected LOS greater than two midnights due to medical therapy. Chest Pain (was working on thursday and strained himself with picking something up, ever since has had chest pain and SOB, pt took nitro 45 minutes ago) and Shortness of Breath  Chief Complaint:        History Of Present Illness:   62 y.o. male with hx of CAD, HTN, HLD,  who presented to [address] [place] [address] with Chest pain and sob, took nitro w/ relief . Also endorses that he has had some chest pain that he attributed to picking up something and thought it was a strain. ED course: Patient's labs, EKG, and chest x-ray appears stable. He was given asa and nitro. Past Medical History:          Diagnosis Date    CAD (coronary artery disease)     Hyperlipidemia     Hypertension        Past Surgical History:          Procedure Laterality Date    DIAGNOSTIC CARDIAC CATH LAB PROCEDURE  07/24/2018    FOOT SURGERY      MANDIBLE FRACTURE SURGERY      PTCA  07/24/2018    SHOULDER ARTHROSCOPY      R shoulder and rotator cuff       Medications Prior to Admission:      Prior to Admission medications    Medication Sig Start Date End Date Taking? Authorizing Provider   nitroGLYCERIN (NITROSTAT) 0.4 MG SL tablet Place 1 tablet under the tongue every 5 minutes as needed for Chest pain up to max of 3 total doses.  If no relief after 1 dose, call 911. 1/5/21  Yes Eli Tran MD   amLODIPine [facility]) 5 MG tablet Take 1 tablet by mouth daily 12/8/20  Yes Eli Tran MD   clopidogrel (PLAVIX) 75 MG tablet Take 1 tablet by mouth daily 12/8/20  Yes Eli Tran MD   atorvastatin (LIPITOR) 40 MG tablet Take 1 tablet by mouth daily 12/8/20  Yes Eli Tran MD   lisinopril (PRINIVIL;ZESTRIL) 10 MG tablet Take 1 tablet by mouth daily 12/8/20  Yes Sujit Lea Brice Ashley MD   pantoprazole (PROTONIX) 40 MG tablet Take 40 mg by mouth daily   Yes Historical Provider, MD   ranolazine (RANEXA) 500 MG extended release tablet Take 1 tablet by mouth 2 times daily 1/5/21   Nathan Gale MD       Allergies:  Coreg [carvedilol], Imdur [isosorbide nitrate], and Codeine    Social History:      The patient currently lives ***    TOBACCO:   reports that he quit smoking about 3 years ago. His smoking use included cigarettes. He has a 43.00 pack-year smoking history. He has never used smokeless tobacco.  ETOH:   reports current alcohol use. E-Cigarettes/Vaping Use     Questions Responses    E-Cigarette/Vaping Use Never User    Start Date     Passive Exposure     Quit Date     Counseling Given     Comments             Family History:      Reviewed in detail and negative for DM, CAD, Cancer, CVA. Positive as follows:        Problem Relation Age of Onset    Heart Attack Mother     Heart Attack Brother        REVIEW OF SYSTEMS COMPLETED:   Pertinent positives as noted in the HPI. All other systems reviewed and negative. PHYSICAL EXAM PERFORMED:    BP (!) 139/95   Pulse 55   Temp 97.6 °F (36.4 °C) (Oral)   Resp 17   Ht 5' 7\" (1.702 m)   Wt 170 lb (77.1 kg)   SpO2 94%   BMI 26.63 kg/m²     General appearance:  No apparent distress, appears stated age and cooperative. HEENT:  Normal cephalic, atraumatic without obvious deformity. Pupils equal, round, and reactive to light. Extra ocular muscles intact. Conjunctivae/corneas clear. Neck: Supple, with full range of motion. No jugular venous distention. Trachea midline. Respiratory:  Normal respiratory effort. Clear to auscultation, bilaterally without Rales/Wheezes/Rhonchi. Cardiovascular:  Regular rate and rhythm with normal S1/S2 without murmurs, rubs or gallops. Abdomen: Soft, non-tender, non-distended with normal bowel sounds. Musculoskeletal:  No clubbing, cyanosis or edema bilaterally.   Full range of motion without deformity. Skin: Skin color, texture, turgor normal.  No rashes or lesions. Neurologic:  Neurovascularly intact without any focal sensory/motor deficits. Cranial nerves: II-XII intact, grossly non-focal.  Psychiatric:  Alert and oriented, thought content appropriate, normal insight  Capillary Refill: Brisk,3 seconds, normal  Peripheral Pulses: +2 palpable, equal bilaterally       Labs:     Recent Labs     11/10/21  0901   WBC 4.9   HGB 14.6   HCT 42.0        Recent Labs     11/10/21  0901      K 4.1   CL 99   CO2 26   BUN 17   CREATININE 1.0   CALCIUM 9.8     Recent Labs     11/10/21  0901   AST 34   ALT 51*   BILITOT 0.5   ALKPHOS 82     No results for input(s): INR in the last 72 hours. Recent Labs     11/10/21  0901   TROPONINI <0.01       Urinalysis:      Lab Results   Component Value Date    NITRU Negative 11/12/2017    BLOODU Negative 11/12/2017    SPECGRAV 1.015 11/12/2017    GLUCOSEU Negative 11/12/2017       Radiology:     CXR:     EKG: Normal sinus rhythmNormal ECGWhen compared with ECG of 19-APR-2017 11:08,No significant change was found    XR CHEST (2 VW)   Final Result   No acute consolidation or infiltrate. ASSESSMENT:    Active Hospital Problems    Diagnosis Date Noted    Mixed hyperlipidemia [E78.2] 08/28/2018    Essential hypertension [I10] 08/28/2018    Coronary artery disease involving native coronary artery of native heart without angina pectoris [I25.10] 08/28/2018         PLAN:    Chest Pain:   Etiology msk verus cardiac. Hx significant CAD s/p PCI, Angina was on Imdure and appears renexa   - ECG and trop WNL. - asa and statin   - Cardiology consulted for risk stratification and further testing needs.    - cont tele   - serial trop     HTN: controlled  - cont home meds     HLD: check lipids   - cont statin     DVT Prophylaxis: Lovenox   Diet: No diet orders on file  Code Status: No Order    PT/OT Eval Status: NA     Dispo - pending work up        Tanya Jones, APRN - CNP    Thank you SHAN Carpio CNP for the opportunity to be involved in this patient's care. If you have any questions or concerns please feel free to contact me at [phone].

## 2021-11-10 NOTE — ED NOTES
Pt states he wants to leave now and doesn't want to wait for second troponin     Juve Overall, RN  11/10/21 [id number]

## 2021-11-10 NOTE — ED PROVIDER NOTES
CHIEF COMPLAINT  Chest Pain (was working on thursday and strained himself with picking something up, ever since has had chest pain and SOB, pt took nitro 45 minutes ago) and Shortness of Breath      HISTORY OF PRESENT ILLNESS  Ashely Ortega is a 62 y.o. male with a history of CAD status post stent who presents to the ED complaining of chest pain. Patient reports that last Thursday he was picking up a countertop when he began noting substernal chest pain and pressure with shortness of breath and radiation to the back. Patient denies fevers, chills, or sweats. Patient reports that he has noted exertional shortness of breath and ongoing chest pain since that time. No obvious alleviating or exacerbating factors. No pain noted with movements. Patient has not yet taken his medications this morning. He follows with Dr. Primo Fay from cardiology. .   No other complaints, modifying factors or associated symptoms. I have reviewed the following from the nursing documentation. Past Medical History:   Diagnosis Date    CAD (coronary artery disease)     Hyperlipidemia     Hypertension      Past Surgical History:   Procedure Laterality Date    DIAGNOSTIC CARDIAC CATH LAB PROCEDURE  07/24/2018    FOOT SURGERY      MANDIBLE FRACTURE SURGERY      PTCA  07/24/2018    SHOULDER ARTHROSCOPY      R shoulder and rotator cuff     Family History   Problem Relation Age of Onset    Heart Attack Mother     Heart Attack Brother      Social History     Socioeconomic History    Marital status:       Spouse name: Not on file    Number of children: Not on file    Years of education: Not on file    Highest education level: Not on file   Occupational History    Not on file   Tobacco Use    Smoking status: Former Smoker     Packs/day: 1.00     Years: 43.00     Pack years: 43.00     Types: Cigarettes     Quit date: 7/23/2018     Years since quitting: 3.3    Smokeless tobacco: Never Used   Vaping Use    Vaping Use: Never used   Substance and Sexual Activity    Alcohol use: Yes     Comment: 4-5 beers daily    Drug use: Yes     Frequency: 3.0 times per week     Types: Marijuana Berneta Herminio)    Sexual activity: Yes     Partners: Female   Other Topics Concern    Not on file   Social History Narrative    Not on file     Social Determinants of Health     Financial Resource Strain:     Difficulty of Paying Living Expenses: Not on file   Food Insecurity:     Worried About Running Out of Food in the Last Year: Not on file    Triny of Food in the Last Year: Not on file   Transportation Needs:     Lack of Transportation (Medical): Not on file    Lack of Transportation (Non-Medical):  Not on file   Physical Activity:     Days of Exercise per Week: Not on file    Minutes of Exercise per Session: Not on file   Stress:     Feeling of Stress : Not on file   Social Connections:     Frequency of Communication with Friends and Family: Not on file    Frequency of Social Gatherings with Friends and Family: Not on file    Attends Christian Services: Not on file    Active Member of [address] [place] [address] or Organizations: Not on file    Attends Club or Organization Meetings: Not on file    Marital Status: Not on file   Intimate Partner Violence:     Fear of Current or Ex-Partner: Not on file    Emotionally Abused: Not on file    Physically Abused: Not on file    Sexually Abused: Not on file   Housing Stability:     Unable to Pay for Housing in the Last Year: Not on file    Number of [place] in the Last Year: Not on file    Unstable Housing in the Last Year: Not on file     Current Facility-Administered Medications   Medication Dose Route Frequency Provider Last Rate Last Admin    acetaminophen (TYLENOL) tablet 650 mg  650 mg Oral Once Akbar [place], DO         Current Outpatient Medications   Medication Sig Dispense Refill    nitroGLYCERIN (NITROSTAT) 0.4 MG SL tablet Place 1 tablet under the tongue every 5 minutes as needed for Chest pain up to max of 3 total doses. If no relief after 1 dose, call 911. 25 tablet 5    amLODIPine (NORVASC) 5 MG tablet Take 1 tablet by mouth daily 90 tablet 3    clopidogrel (PLAVIX) 75 MG tablet Take 1 tablet by mouth daily 90 tablet 3    atorvastatin (LIPITOR) 40 MG tablet Take 1 tablet by mouth daily 90 tablet 3    lisinopril (PRINIVIL;ZESTRIL) 10 MG tablet Take 1 tablet by mouth daily 90 tablet 3    pantoprazole (PROTONIX) 40 MG tablet Take 40 mg by mouth daily      ranolazine (RANEXA) 500 MG extended release tablet Take 1 tablet by mouth 2 times daily 60 tablet 5     Allergies   Allergen Reactions    Coreg [Carvedilol]      Fatigue     Imdur [Isosorbide Nitrate] Other (See Comments)     headache    Codeine Palpitations       REVIEW OF SYSTEMS  10 systems reviewed, pertinent positives per HPI otherwise noted to be negative. PHYSICAL EXAM  BP (!) 139/95   Pulse 55   Temp 97.6 °F (36.4 °C) (Oral)   Resp 17   Ht 5' 7\" (1.702 m)   Wt 170 lb (77.1 kg)   SpO2 94%   BMI 26.63 kg/m²   GENERAL APPEARANCE: Awake and alert. Cooperative. No acute distress. HEAD: Normocephalic. Atraumatic. EYES: PERRL. EOM's grossly intact. ENT: Mucous membranes are moist.   NECK: Supple, trachea midline. HEART: RRR. Normal S1, S2. No murmurs, rubs or gallops. Chest wall: Nontender to palpation. No crepitus. Full range of motion of left shoulder without reproduction of pain. LUNGS: Respirations unlabored. CTAB. Good air exchange. No wheezes, rales, or rhonchi. Speaking comfortably in full sentences. ABDOMEN: Soft. Non-distended. Non-tender. No guarding or rebound. Normal Bowel sounds. EXTREMITIES: No peripheral edema. MAEE. No acute deformities. SKIN: Warm and dry. No acute rashes. NEUROLOGICAL: Alert and oriented X 3. CN II-XII intact. No gross facial drooping. Strength 5/5, sensation intact. No pronator drift. Normal coordination. Gait normal.   PSYCHIATRIC: Normal mood and affect.     LABS  I have reviewed all labs for this visit.    Results for orders placed or performed during the hospital encounter of 11/10/21   CBC Auto Differential   Result Value Ref Range    WBC 4.9 4.0 - 11.0 K/uL    RBC 4.86 4.20 - 5.90 M/uL    Hemoglobin 14.6 13.5 - 17.5 g/dL    Hematocrit 42.0 40.5 - 52.5 %    MCV 86.5 80.0 - 100.0 fL    MCH 30.0 26.0 - 34.0 pg    MCHC 34.6 31.0 - 36.0 g/dL    RDW 14.0 12.4 - 15.4 %    Platelets 292 200 - 967 K/uL    MPV 9.0 5.0 - 10.5 fL    Neutrophils % 66.0 %    Lymphocytes % 23.3 %    Monocytes % 7.1 %    Eosinophils % 2.8 %    Basophils % 0.8 %    Neutrophils Absolute 3.2 1.7 - 7.7 K/uL    Lymphocytes Absolute 1.1 1.0 - 5.1 K/uL    Monocytes Absolute 0.3 0.0 - 1.3 K/uL    Eosinophils Absolute 0.1 0.0 - 0.6 K/uL    Basophils Absolute 0.0 0.0 - 0.2 K/uL   Comprehensive Metabolic Panel   Result Value Ref Range    Sodium 138 136 - 145 mmol/L    Potassium 4.1 3.5 - 5.1 mmol/L    Chloride 99 99 - 110 mmol/L    CO2 26 21 - 32 mmol/L    Anion Gap 13 3 - 16    Glucose 109 (H) 70 - 99 mg/dL    BUN 17 7 - 20 mg/dL    CREATININE 1.0 0.8 - 1.3 mg/dL    GFR Non-African American >60 >60    GFR African American >60 >60    Calcium 9.8 8.3 - 10.6 mg/dL    Total Protein 8.2 6.4 - 8.2 g/dL    Albumin 4.6 3.4 - 5.0 g/dL    Albumin/Globulin Ratio 1.3 1.1 - 2.2    Total Bilirubin 0.5 0.0 - 1.0 mg/dL    Alkaline Phosphatase 82 40 - 129 U/L    ALT 51 (H) 10 - 40 U/L    AST 34 15 - 37 U/L   Troponin   Result Value Ref Range    Troponin <0.01 <0.01 ng/mL   EKG 12 Lead   Result Value Ref Range    Ventricular Rate 64 BPM    Atrial Rate 64 BPM    P-R Interval 154 ms    QRS Duration 98 ms    Q-T Interval 404 ms    QTc Calculation (Bazett) 416 ms    P Axis 48 degrees    R Axis 62 degrees    T Axis 62 degrees    Diagnosis       Normal sinus rhythmNormal ECGWhen compared with ECG of 19-APR-2017 11:08,No significant change was found       EKG  The Ekg interpreted by myself  normal sinus rhythm with a rate of 64  Axis is Normal  QTc is  normal  Intervals and Durations are unremarkable. No specific ST-T wave changes appreciated. No evidence of acute ischemia. No significant change from prior EKG dated 4/19/2017    Cardiac Monitoring: No ectopy        RADIOLOGY  X-RAYS:  I have reviewed radiologic plain film image(s). ALL OTHER NON-PLAIN FILM IMAGES SUCH AS CT, ULTRASOUND AND MRI HAVE BEEN READ BY THE RADIOLOGIST. XR CHEST (2 VW)   Final Result   No acute consolidation or infiltrate. Rechecks: Physical assessment performed. 1005: Pain 1/10. HEART SCORE: 5        ED COURSE/MDM  Patient seen and evaluated. Old records reviewed. Labs and imaging reviewed and results discussed with patient. Patient was given aspirin and nitroglycerin. Pain improved post nitro at home and in the emergency department. Patient was reassessed as noted above . Patient's labs, EKG, and chest x-ray appears stable. He will be admitted to hospitalist after further evaluation and treatment. . Plan of care discussed with patient and family. Patient and family in agreement with plan. Patient was given scripts for the following medications. I counseled patient how to take these medications. New Prescriptions    No medications on file       CLINICAL IMPRESSION  1. Chest pain, unspecified type        Blood pressure (!) 139/95, pulse 55, temperature 97.6 °F (36.4 °C), temperature source Oral, resp. rate 17, height 5' 7\" (1.702 m), weight 170 lb (77.1 kg), SpO2 94 %. Lisbet Bui was admitted in stable condition.         Ben Lopez DO  11/10/21 1045

## 2021-11-10 NOTE — ED NOTES
Pt getting dressed;Dr Reanna Rockwell notified pt states h is leaving     Antoine Samuel RN  11/10/21 [id number]

## 2021-11-22 NOTE — PROGRESS NOTES
[facility]   Cardiac Follow Up    Referring Provider:  Joelyn Klinefelter, APRN - CNP     Chief Complaint   Patient presents with    Follow-up     took nitro last week    Coronary Artery Disease    Hypertension    Hyperlipidemia    Shortness of Breath     comes and goes happens with activity.  Chest Pain     with SOB, and pressure always there. Subjective:  Patient being seen today for routine f/u CAD with hx LHC 7/24/18 s/p 1 stent, HTN, HLD; c/o exertional SOB and chest tightness today    Past Medical History:    Mr. Sandrita Gray is a 60yr old male self-referred and established cardiac care with me 8/28/18 s/p OM#2 HESHAM placed 7/18. He has PMH of HTN, HLD, CAD s/p mild NSTEMI and OM#2 stent 7/18, hx Covid PNA, and prior tobacco use (quit 2018 after NSTEMI). Mr Sandrita Gray woke up with mid-sternal CP radiating into neck region. Drove to [facility] ER and took NST SL and aspirin and got relief of his pain. Troponin peak 0.05. Most recent LHC by Dr. Irma Rice on 7/24/18 2V CAD with proximal RCA 50-60% stenosis and OM2 with mid-vessel 75% stenosis; proximal mid-LAD 25-30% plaquing but not severe. He had a successful HESHAM stent to the mid-OM2. Most recent ECHO 7/24/18 EF=55-60%. Normal diastolic function. EKG 12/8/20 NSR 67bpm (7/24/18 SB)    History of Present Illness:     Last OV 6/11/21 he felt fine. OV 12/8/20 nuc stress test ordered but he did not test due to cost. Note HA with imdur and stopped on his own. Today he reports having new SOB on exertion. He works installing countertops, and recently, early [facility], had SOB and chest tightness after carrying a countertop up the stairs. He took a SL nitro, which did not help. Went to ED on 11/10/21 with these complaints. CXR showed the aorta is mildly atherosclerotic, but otherwise negative. Most recent EKG 11/10/21, showed NSR, HR 64 . Troponin was negative. He states he recently had lab work done with his NP at [employer] (no copy to review).   He reports he recently saw a pulmonologist, Dr. Clare Perez, who says to follow up on a as needed basis. He does NOT want to take Covid vaccine. Past Medical History:   has a past medical history of CAD (coronary artery disease), Hyperlipidemia, and Hypertension. Surgical History:   has a past surgical history that includes Mandible fracture surgery; Shoulder arthroscopy; Foot surgery; Diagnostic Cardiac Cath Lab Procedure (07/24/2018); and Percutaneous Transluminal Coronary Angio (07/24/2018). Social History:   He is , works full time installing granite counter tops, lives with spouse. He reports that he quit smoking 2018. His smoking use included Cigarettes. He has a 43.00 pack-year smoking history. He has never used smokeless tobacco. He reports that he drinks alcohol. He reports that he does not use drugs. Family History:  Mom MI CABG age 62 Brother MI stents age 63's Dad no heart dz  family history includes Heart Attack in his brother and mother. Home Medications:  Prior to Admission medications    Medication Sig Start Date End Date Taking? Authorizing Provider   ibuprofen (ADVIL;MOTRIN) 200 MG tablet Take 200 mg by mouth every 6 hours as needed for Pain   Yes Historical Provider, MD   nitroGLYCERIN (NITROSTAT) 0.4 MG SL tablet Place 1 tablet under the tongue every 5 minutes as needed for Chest pain up to max of 3 total doses.  If no relief after 1 dose, call 911. 1/5/21  Yes Felix Ortega MD   amLODIPine [facility]) 5 MG tablet Take 1 tablet by mouth daily 12/8/20  Yes Felix Ortega MD   clopidogrel (PLAVIX) 75 MG tablet Take 1 tablet by mouth daily 12/8/20  Yes Felix Ortega MD   atorvastatin (LIPITOR) 40 MG tablet Take 1 tablet by mouth daily  Patient taking differently: Take 80 mg by mouth daily  12/8/20  Yes Felix Ortega MD   lisinopril (PRINIVIL;ZESTRIL) 10 MG tablet Take 1 tablet by mouth daily 12/8/20  Yes Felix Ortega MD   pantoprazole (PROTONIX) 40 MG tablet Take 40 mg by mouth daily   Yes Historical Provider, MD        Allergies:  Coreg [carvedilol], Imdur [isosorbide nitrate], and Codeine     Review of Systems:   · Constitutional: there has been no unanticipated weight loss. There's been no change in energy level, sleep pattern, or activity level. · Eyes: No visual changes or diplopia. No scleral icterus. · ENT: No Headaches, hearing loss or vertigo. No mouth sores or sore throat. · Cardiovascular: Reviewed in HPI  · Respiratory: No cough or wheezing, no sputum production. No hematemesis. · Gastrointestinal: No abdominal pain, appetite loss, blood in stools. No change in bowel or bladder habits. · Genitourinary: No dysuria, trouble voiding, or hematuria. · Musculoskeletal:  No gait disturbance, weakness or joint complaints. · Integumentary: No rash or pruritis. · Neurological: No headache, diplopia, change in muscle strength, numbness or tingling. No change in gait, balance, coordination, mood, affect, memory, mentation, behavior. · Psychiatric: No anxiety, no depression. · Endocrine: No malaise, fatigue or temperature intolerance. No excessive thirst, fluid intake, or urination. No tremor. · Hematologic/Lymphatic: No abnormal bruising or bleeding, blood clots or swollen lymph nodes. · Allergic/Immunologic: No nasal congestion or hives. Physical Examination:    Vitals:    11/23/21 1011   BP: 134/84   Pulse: 68   SpO2: 98%      Wt Readings from Last 3 Encounters:   11/23/21 176 lb (79.8 kg)   11/10/21 170 lb (77.1 kg)   06/11/21 174 lb 9.6 oz (79.2 kg)         Constitutional and General Appearance: NAD   Respiratory:  · Normal excursion and expansion without use of accessory muscles  · Resp Auscultation: Normal breath sounds without dullness Clear no crackles or wheezes.    Cardiovascular:  · The apical impulses not displaced  · Heart tones are crisp and normal  · Cervical veins are not engorged  · The carotid upstroke is normal in amplitude and contour without delay or bruit  · Normal S1S2, No S3, No Murmur  · Peripheral pulses are symmetrical and full  · There is no clubbing, cyanosis of the extremities. · No edema  · Femoral Arteries: 2+ and equal  · Pedal Pulses: 2+ and equal   Abdomen:  · No masses or tenderness  · Liver/Spleen: No Abnormalities Noted  Neurological/Psychiatric:  · Alert and oriented in all spheres  · Moves all extremities well  · Exhibits normal gait balance and coordination  · No abnormalities of mood, affect, memory, mentation, or behavior are noted  Skin:  · Skin: warm and dry. Lab Results   Component Value Date    CHOL 194 10/22/2018     Lab Results   Component Value Date    TRIG 167 (H) 10/22/2018     Lab Results   Component Value Date    HDL 58 10/22/2018     Lab Results   Component Value Date    LDLCALC 103 (H) 10/22/2018     Lab Results   Component Value Date    LABVLDL 33 10/22/2018     Assessment:     1. Coronary artery disease involving native coronary artery of native heart without angina pectoris:  Most recent  LHC 7/24/18 showed 2V CAD with 1 requiring PCI s/p successful HESHAM stent to the mid-OM2. Most recent ECHO 7/24/18 EF=55-60%. Normal diastolic function. Mild NSTEMI in 2018. Need concern for BRUNO and chest tightness with exertion being due to angina and/or development of cardiomyopathy. No cardiac testing x 3 years and given CAD history he merits non-invasive cardiac evaluation. 2. Essential hypertension Stable and will continue present medical regimen. 3. Mixed hyperlipidemia Last lipids 10/22/18. Suboptimal and started high dose 80mg lipitor. He had GI upset and decreased to 40mg daily. He has not gotten labs drawn in 2 years and I strongly encouraged him to do so. PCP follows and will adjust.      Plan:  1. Echocardiogram to check structure and function of the heart  2. Exercise myoview stress test to evaluate for ischemia  3.  Restriction letter made to avoid heavy lifting, until cardiac testing is

## 2021-11-23 ENCOUNTER — OFFICE VISIT (OUTPATIENT)
Dept: CARDIOLOGY CLINIC | Age: 61
End: 2021-11-23
Payer: COMMERCIAL

## 2021-11-23 VITALS
WEIGHT: 176 LBS | SYSTOLIC BLOOD PRESSURE: 134 MMHG | OXYGEN SATURATION: 98 % | HEIGHT: 67 IN | BODY MASS INDEX: 27.62 KG/M2 | DIASTOLIC BLOOD PRESSURE: 84 MMHG | HEART RATE: 68 BPM

## 2021-11-23 DIAGNOSIS — I25.10 CORONARY ARTERY DISEASE INVOLVING NATIVE CORONARY ARTERY OF NATIVE HEART WITHOUT ANGINA PECTORIS: Primary | ICD-10-CM

## 2021-11-23 DIAGNOSIS — I10 ESSENTIAL HYPERTENSION: ICD-10-CM

## 2021-11-23 DIAGNOSIS — Z87.891 HISTORY OF TOBACCO ABUSE: ICD-10-CM

## 2021-11-23 DIAGNOSIS — R07.9 CHEST PAIN, UNSPECIFIED TYPE: ICD-10-CM

## 2021-11-23 DIAGNOSIS — R06.09 DOE (DYSPNEA ON EXERTION): ICD-10-CM

## 2021-11-23 PROCEDURE — 99214 OFFICE O/P EST MOD 30 MIN: CPT | Performed by: INTERNAL MEDICINE

## 2021-11-23 RX ORDER — IBUPROFEN 200 MG
200 TABLET ORAL EVERY 6 HOURS PRN
COMMUNITY
End: 2022-05-02

## 2021-11-23 NOTE — PATIENT INSTRUCTIONS
Plan:  1. Echocardiogram to check structure and function of the heart  2. Stress test to evaluate for ischemia  3. Restriction letter made to avoid heavy lifting, until cardiac testing is completed. 4.Based on these test results, we will evaluate when you need to be seen again in office. Your provider has ordered testing for further evaluation. An order/prescription has been included in your paper work.  To schedule outpatient testing, contact Central Scheduling by calling "[employer] ([phone]).

## 2021-11-23 NOTE — LETTER
[facility] - [address]  Phone: [phone]  Fax: [phone]    Suraj Donohue MD        November 23, 2021     Patient: Soy Martin   YOB: 1960   Date of Visit: 11/23/2021       To Whom It May Concern: It is my medical opinion that Ned Guzman should avoid heavy lifting until cardiac testing is completed. If you have any questions or concerns, please don't hesitate to call.     Sincerely,        Suraj Donohue MD

## 2021-11-23 NOTE — LETTER
Gaston Abebe    1960        [facility]   Cardiac Follow Up    Referring Provider:  SHAN Durand - CNP     Chief Complaint   Patient presents with    Follow-up     took nitro last week    Coronary Artery Disease    Hypertension    Hyperlipidemia    Shortness of Breath     comes and goes happens with activity.  Chest Pain     with SOB, and pressure always there. Subjective:  Patient being seen today for routine f/u CAD with hx LHC 7/24/18 s/p 1 stent, HTN, HLD; c/o exertional SOB and chest tightness today    Past Medical History:    Mr. Ana Paula Winter is a 60yr old male self-referred and established cardiac care with me 8/28/18 s/p OM#2 HESHAM placed 7/18. He has PMH of HTN, HLD, CAD s/p mild NSTEMI and OM#2 stent 7/18, hx Covid PNA, and prior tobacco use (quit 2018 after NSTEMI). Mr Ana Paula Winter woke up with mid-sternal CP radiating into neck region. Drove to [facility] ER and took NST SL and aspirin and got relief of his pain. Troponin peak 0.05. Most recent LHC by Dr. Kristian Estrada on 7/24/18 2V CAD with proximal RCA 50-60% stenosis and OM2 with mid-vessel 75% stenosis; proximal mid-LAD 25-30% plaquing but not severe. He had a successful HESHAM stent to the mid-OM2. Most recent ECHO 7/24/18 EF=55-60%. Normal diastolic function. EKG 12/8/20 NSR 67bpm (7/24/18 SB)    History of Present Illness:     Last OV 6/11/21 he felt fine. OV 12/8/20 nuc stress test ordered but he did not test due to cost. Note HA with imdur and stopped on his own. Today he reports having new SOB on exertion. He works installing countertops, and recently, early [facility], had SOB and chest tightness after carrying a countertop up the stairs. He took a SL nitro, which did not help. Went to ED on 11/10/21 with these complaints. CXR showed the aorta is mildly atherosclerotic, but otherwise negative. Most recent EKG 11/10/21, showed NSR, HR 64 . Troponin was negative.  He states he recently had lab work done with his NP at [facility] (no copy to review). He reports he recently saw a pulmonologist, Dr. Bertha Frey, who says to follow up on a as needed basis. He does NOT want to take Covid vaccine. Past Medical History:   has a past medical history of CAD (coronary artery disease), Hyperlipidemia, and Hypertension. Surgical History:   has a past surgical history that includes Mandible fracture surgery; Shoulder arthroscopy; Foot surgery; Diagnostic Cardiac Cath Lab Procedure (07/24/2018); and Percutaneous Transluminal Coronary Angio (07/24/2018). Social History:   He is , works full time installing granite counter tops, lives with spouse. He reports that he quit smoking 2018. His smoking use included Cigarettes. He has a 43.00 pack-year smoking history. He has never used smokeless tobacco. He reports that he drinks alcohol. He reports that he does not use drugs. Family History:  Mom MI CABG age 62 Brother MI stents age 63's Dad no heart dz  family history includes Heart Attack in his brother and mother. Home Medications:  Prior to Admission medications    Medication Sig Start Date End Date Taking? Authorizing Provider   ibuprofen (ADVIL;MOTRIN) 200 MG tablet Take 200 mg by mouth every 6 hours as needed for Pain   Yes Historical Provider, MD   nitroGLYCERIN (NITROSTAT) 0.4 MG SL tablet Place 1 tablet under the tongue every 5 minutes as needed for Chest pain up to max of 3 total doses.  If no relief after 1 dose, call 911. 1/5/21  Yes Venessa Lynn MD   amLODIPine [facility]) 5 MG tablet Take 1 tablet by mouth daily 12/8/20  Yes Venessa Lynn MD   clopidogrel (PLAVIX) 75 MG tablet Take 1 tablet by mouth daily 12/8/20  Yes Venessa Lynn MD   atorvastatin (LIPITOR) 40 MG tablet Take 1 tablet by mouth daily  Patient taking differently: Take 80 mg by mouth daily  12/8/20  Yes Venessa Lynn MD   lisinopril (PRINIVIL;ZESTRIL) 10 MG tablet Take 1 tablet by mouth daily 12/8/20  Yes Venessa Lynn MD pantoprazole (PROTONIX) 40 MG tablet Take 40 mg by mouth daily   Yes Historical Provider, MD        Allergies:  Coreg [carvedilol], Imdur [isosorbide nitrate], and Codeine     Review of Systems:   · Constitutional: there has been no unanticipated weight loss. There's been no change in energy level, sleep pattern, or activity level. · Eyes: No visual changes or diplopia. No scleral icterus. · ENT: No Headaches, hearing loss or vertigo. No mouth sores or sore throat. · Cardiovascular: Reviewed in HPI  · Respiratory: No cough or wheezing, no sputum production. No hematemesis. · Gastrointestinal: No abdominal pain, appetite loss, blood in stools. No change in bowel or bladder habits. · Genitourinary: No dysuria, trouble voiding, or hematuria. · Musculoskeletal:  No gait disturbance, weakness or joint complaints. · Integumentary: No rash or pruritis. · Neurological: No headache, diplopia, change in muscle strength, numbness or tingling. No change in gait, balance, coordination, mood, affect, memory, mentation, behavior. · Psychiatric: No anxiety, no depression. · Endocrine: No malaise, fatigue or temperature intolerance. No excessive thirst, fluid intake, or urination. No tremor. · Hematologic/Lymphatic: No abnormal bruising or bleeding, blood clots or swollen lymph nodes. · Allergic/Immunologic: No nasal congestion or hives. Physical Examination:    Vitals:    11/23/21 1011   BP: 134/84   Pulse: 68   SpO2: 98%      Wt Readings from Last 3 Encounters:   11/23/21 176 lb (79.8 kg)   11/10/21 170 lb (77.1 kg)   06/11/21 174 lb 9.6 oz (79.2 kg)         Constitutional and General Appearance: NAD   Respiratory:  · Normal excursion and expansion without use of accessory muscles  · Resp Auscultation: Normal breath sounds without dullness Clear no crackles or wheezes.    Cardiovascular:  · The apical impulses not displaced  · Heart tones are crisp and normal  · Cervical veins are not engorged  · The carotid upstroke is normal in amplitude and contour without delay or bruit  · Normal S1S2, No S3, No Murmur  · Peripheral pulses are symmetrical and full  · There is no clubbing, cyanosis of the extremities. · No edema  · Femoral Arteries: 2+ and equal  · Pedal Pulses: 2+ and equal   Abdomen:  · No masses or tenderness  · Liver/Spleen: No Abnormalities Noted  Neurological/Psychiatric:  · Alert and oriented in all spheres  · Moves all extremities well  · Exhibits normal gait balance and coordination  · No abnormalities of mood, affect, memory, mentation, or behavior are noted  Skin:  · Skin: warm and dry. Lab Results   Component Value Date    CHOL 194 10/22/2018     Lab Results   Component Value Date    TRIG 167 (H) 10/22/2018     Lab Results   Component Value Date    HDL 58 10/22/2018     Lab Results   Component Value Date    LDLCALC 103 (H) 10/22/2018     Lab Results   Component Value Date    LABVLDL 33 10/22/2018     Assessment:     1. Coronary artery disease involving native coronary artery of native heart without angina pectoris:  Most recent  LHC 7/24/18 showed 2V CAD with 1 requiring PCI s/p successful HESHAM stent to the mid-OM2. Most recent ECHO 7/24/18 EF=55-60%. Normal diastolic function. Mild NSTEMI in 2018. Need concern for BRUNO and chest tightness with exertion being due to angina and/or development of cardiomyopathy. No cardiac testing x 3 years and given CAD history he merits non-invasive cardiac evaluation. 2. Essential hypertension Stable and will continue present medical regimen. 3. Mixed hyperlipidemia Last lipids 10/22/18. Suboptimal and started high dose 80mg lipitor. He had GI upset and decreased to 40mg daily. He has not gotten labs drawn in 2 years and I strongly encouraged him to do so. PCP follows and will adjust.      Plan:  1. Echocardiogram to check structure and function of the heart  2. Exercise myoview stress test to evaluate for ischemia  3.  Restriction letter made to avoid heavy lifting, until cardiac testing is completed. 4.Based on these test results, we will evaluate when you need to be seen again in office. Scribe's attestation: This note was scribed in the presence of Dr. Yaniv De La Paz M.D. By Jovanny Moctezuma, Dr. Yaniv De La Paz, personally performed the services described in this documentation, as scribed by the above signed scribe in my presence. It is both accurate and complete to my knowledge. I agree with the details independently gathered by the clinical support staff, while the remaining scribed note accurately describes my personal service to the patient. Cost of prescription medications and patient compliance have been reviewed with patient. All questions answered. Thank you for allowing me to participate in the care of this individual.    Arabella Caballero.  Sadia Smith M.D., [facility]

## 2021-12-03 ENCOUNTER — HOSPITAL ENCOUNTER (OUTPATIENT)
Dept: NUCLEAR MEDICINE | Age: 61
Discharge: HOME | End: 2021-12-03
Payer: COMMERCIAL

## 2021-12-03 ENCOUNTER — HOSPITAL ENCOUNTER (OUTPATIENT)
Dept: NON INVASIVE DIAGNOSTICS | Age: 61
Discharge: HOME | End: 2021-12-03
Payer: COMMERCIAL

## 2021-12-03 ENCOUNTER — HOSPITAL ENCOUNTER (OUTPATIENT)
Dept: CARDIOLOGY | Age: 61
Discharge: HOME | End: 2021-12-03
Payer: COMMERCIAL

## 2021-12-03 DIAGNOSIS — I25.10 CORONARY ARTERY DISEASE INVOLVING NATIVE CORONARY ARTERY OF NATIVE HEART WITHOUT ANGINA PECTORIS: ICD-10-CM

## 2021-12-03 DIAGNOSIS — R07.9 CHEST PAIN, UNSPECIFIED TYPE: ICD-10-CM

## 2021-12-03 DIAGNOSIS — R06.09 DOE (DYSPNEA ON EXERTION): ICD-10-CM

## 2021-12-03 LAB
LV EF: 58 %
LV EF: 66 %
LVEF MODALITY: NORMAL
LVEF MODALITY: NORMAL

## 2021-12-03 PROCEDURE — 93306 TTE W/DOPPLER COMPLETE: CPT

## 2021-12-03 PROCEDURE — 78452 HT MUSCLE IMAGE SPECT MULT: CPT

## 2021-12-03 PROCEDURE — A9502 TC99M TETROFOSMIN: HCPCS | Performed by: INTERNAL MEDICINE

## 2021-12-03 PROCEDURE — 93017 CV STRESS TEST TRACING ONLY: CPT

## 2021-12-03 PROCEDURE — 3430000000 HC RX DIAGNOSTIC RADIOPHARMACEUTICAL: Performed by: INTERNAL MEDICINE

## 2021-12-03 RX ADMIN — TETROFOSMIN 10.4 MILLICURIE: 1.38 INJECTION, POWDER, LYOPHILIZED, FOR SOLUTION INTRAVENOUS at 11:47

## 2021-12-03 RX ADMIN — TETROFOSMIN 32.8 MILLICURIE: 1.38 INJECTION, POWDER, LYOPHILIZED, FOR SOLUTION INTRAVENOUS at 13:07

## 2021-12-17 NOTE — TELEPHONE ENCOUNTER
Pt is calling regarding his refills above. Pt is completely out of all of the medications. Please refill asap.

## 2021-12-21 RX ORDER — ATORVASTATIN CALCIUM 80 MG/1
80 TABLET, FILM COATED ORAL DAILY
Qty: 90 TABLET | Refills: 3 | Status: SHIPPED | OUTPATIENT
Start: 2021-12-21

## 2021-12-21 RX ORDER — CLOPIDOGREL BISULFATE 75 MG/1
75 TABLET ORAL DAILY
Qty: 90 TABLET | Refills: 3 | Status: SHIPPED | OUTPATIENT
Start: 2021-12-21

## 2021-12-21 RX ORDER — LISINOPRIL 10 MG/1
10 TABLET ORAL DAILY
Qty: 90 TABLET | Refills: 3 | Status: SHIPPED | OUTPATIENT
Start: 2021-12-21

## 2021-12-21 RX ORDER — AMLODIPINE BESYLATE 5 MG/1
5 TABLET ORAL DAILY
Qty: 90 TABLET | Refills: 3 | Status: SHIPPED | OUTPATIENT
Start: 2021-12-21

## 2022-05-02 ENCOUNTER — OFFICE VISIT (OUTPATIENT)
Dept: FAMILY MEDICINE CLINIC | Age: 62
End: 2022-05-02
Payer: COMMERCIAL

## 2022-05-02 VITALS
DIASTOLIC BLOOD PRESSURE: 70 MMHG | BODY MASS INDEX: 27.1 KG/M2 | OXYGEN SATURATION: 97 % | SYSTOLIC BLOOD PRESSURE: 122 MMHG | HEART RATE: 64 BPM | WEIGHT: 173 LBS

## 2022-05-02 DIAGNOSIS — Z72.0 TOBACCO ABUSE: ICD-10-CM

## 2022-05-02 DIAGNOSIS — I25.10 CORONARY ARTERY DISEASE INVOLVING NATIVE CORONARY ARTERY OF NATIVE HEART WITHOUT ANGINA PECTORIS: ICD-10-CM

## 2022-05-02 DIAGNOSIS — M79.641 RIGHT HAND PAIN: ICD-10-CM

## 2022-05-02 DIAGNOSIS — I10 ESSENTIAL HYPERTENSION: ICD-10-CM

## 2022-05-02 DIAGNOSIS — Z13.1 SCREENING FOR DIABETES MELLITUS (DM): ICD-10-CM

## 2022-05-02 DIAGNOSIS — E78.2 MIXED HYPERLIPIDEMIA: Primary | ICD-10-CM

## 2022-05-02 DIAGNOSIS — Z87.891 PERSONAL HISTORY OF TOBACCO USE: ICD-10-CM

## 2022-05-02 DIAGNOSIS — Z12.5 PROSTATE CANCER SCREENING: ICD-10-CM

## 2022-05-02 LAB
A/G RATIO: 2.2 (ref 1.1–2.2)
ALBUMIN SERPL-MCNC: 5 G/DL (ref 3.4–5)
ALP BLD-CCNC: 84 U/L (ref 40–129)
ALT SERPL-CCNC: 42 U/L (ref 10–40)
ANION GAP SERPL CALCULATED.3IONS-SCNC: 12 MMOL/L (ref 3–16)
AST SERPL-CCNC: 33 U/L (ref 15–37)
BASOPHILS ABSOLUTE: 0 K/UL (ref 0–0.2)
BASOPHILS RELATIVE PERCENT: 0.7 %
BILIRUB SERPL-MCNC: 0.4 MG/DL (ref 0–1)
BUN BLDV-MCNC: 17 MG/DL (ref 7–20)
CALCIUM SERPL-MCNC: 9.7 MG/DL (ref 8.3–10.6)
CHLORIDE BLD-SCNC: 102 MMOL/L (ref 99–110)
CHOLESTEROL, TOTAL: 195 MG/DL (ref 0–199)
CO2: 24 MMOL/L (ref 21–32)
CREAT SERPL-MCNC: 1.1 MG/DL (ref 0.8–1.3)
EOSINOPHILS ABSOLUTE: 0.3 K/UL (ref 0–0.6)
EOSINOPHILS RELATIVE PERCENT: 5.8 %
GFR AFRICAN AMERICAN: >60
GFR NON-AFRICAN AMERICAN: >60
GLUCOSE BLD-MCNC: 104 MG/DL (ref 70–99)
HCT VFR BLD CALC: 42.5 % (ref 40.5–52.5)
HDLC SERPL-MCNC: 60 MG/DL (ref 40–60)
HEMOGLOBIN: 14.4 G/DL (ref 13.5–17.5)
LDL CHOLESTEROL CALCULATED: 119 MG/DL
LYMPHOCYTES ABSOLUTE: 1.2 K/UL (ref 1–5.1)
LYMPHOCYTES RELATIVE PERCENT: 27.5 %
MCH RBC QN AUTO: 30.3 PG (ref 26–34)
MCHC RBC AUTO-ENTMCNC: 33.9 G/DL (ref 31–36)
MCV RBC AUTO: 89.4 FL (ref 80–100)
MONOCYTES ABSOLUTE: 0.5 K/UL (ref 0–1.3)
MONOCYTES RELATIVE PERCENT: 10.7 %
NEUTROPHILS ABSOLUTE: 2.4 K/UL (ref 1.7–7.7)
NEUTROPHILS RELATIVE PERCENT: 55.3 %
PDW BLD-RTO: 13.7 % (ref 12.4–15.4)
PLATELET # BLD: 201 K/UL (ref 135–450)
PMV BLD AUTO: 8.7 FL (ref 5–10.5)
POTASSIUM SERPL-SCNC: 4.4 MMOL/L (ref 3.5–5.1)
PROSTATE SPECIFIC ANTIGEN: 0.46 NG/ML (ref 0–4)
RBC # BLD: 4.75 M/UL (ref 4.2–5.9)
SODIUM BLD-SCNC: 138 MMOL/L (ref 136–145)
TOTAL PROTEIN: 7.3 G/DL (ref 6.4–8.2)
TRIGL SERPL-MCNC: 82 MG/DL (ref 0–150)
VLDLC SERPL CALC-MCNC: 16 MG/DL
WBC # BLD: 4.4 K/UL (ref 4–11)

## 2022-05-02 PROCEDURE — 99204 OFFICE O/P NEW MOD 45 MIN: CPT

## 2022-05-02 PROCEDURE — G0296 VISIT TO DETERM LDCT ELIG: HCPCS

## 2022-05-02 PROCEDURE — 36415 COLL VENOUS BLD VENIPUNCTURE: CPT

## 2022-05-02 RX ORDER — TIZANIDINE 4 MG/1
4 TABLET ORAL NIGHTLY
Qty: 30 TABLET | Refills: 0 | Status: SHIPPED | OUTPATIENT
Start: 2022-05-02

## 2022-05-02 ASSESSMENT — ENCOUNTER SYMPTOMS
COLOR CHANGE: 0
WHEEZING: 0
ABDOMINAL PAIN: 0
COUGH: 0
DIARRHEA: 0
EYE PAIN: 0
CHEST TIGHTNESS: 0
ABDOMINAL DISTENTION: 0
SHORTNESS OF BREATH: 1
EYE DISCHARGE: 0
SORE THROAT: 0
CONSTIPATION: 0
APNEA: 0
BLURRED VISION: 0

## 2022-05-02 ASSESSMENT — PATIENT HEALTH QUESTIONNAIRE - PHQ9
SUM OF ALL RESPONSES TO PHQ9 QUESTIONS 1 & 2: 0
SUM OF ALL RESPONSES TO PHQ QUESTIONS 1-9: 0
2. FEELING DOWN, DEPRESSED OR HOPELESS: 0
1. LITTLE INTEREST OR PLEASURE IN DOING THINGS: 0
SUM OF ALL RESPONSES TO PHQ QUESTIONS 1-9: 0

## 2022-05-02 NOTE — PROGRESS NOTES
[facility]  Establish care visit   2022    Fransisco Leone (:  1960) is a 65 y.o. male, here to establish care. Chief Complaint   Patient presents with   [address]     former pt of Dr uEla Mcfarlane         ASSESSMENT/ PLAN  1. Mixed hyperlipidemia  -Recheck lipids  -Continue statin  - Lipid Panel  - Comprehensive Metabolic Panel    2. Essential hypertension  -Controlled on lisinopril and Norvasc  -Continue  - CBC with Auto Differential    3. Coronary artery disease involving native coronary artery of native heart without angina pectoris  -Status post cardiac stent in 2018  -No residual symptoms of chest pain  -Continue to follow with cardiology as directed    4. Screening for diabetes mellitus (DM)  -A1c ordered  - Hemoglobin A1C    5. Tobacco abuse  -Quit smoking in 2018    6. Personal history of tobacco use  -Quit smoking 2018. Did have a significant pack year history of over 40 years  -He does report knot that arises in supraclavicular region of the left clavicle that is intermittent. Not painful. Concerning for supraclavicular lymphadenopathy. We will do low-dose lung screening    - AK VISIT TO DISCUSS LUNG CA SCREEN W LDCT  - CT Lung Screen (Annual); Future    7. Prostate cancer screening  -PSA level  - PSA Screening    8. Right hand pain  -Right hand pain where he has bony tenderness in the lunate and trapezoid metacarpals. He also reports tenderness to the proximal DIP joint. Question whether it is secondary to osteoarthritis from his occupation as well as motorcycle riding for several years. He does have weakness in his right upper extremity as well as pain with grasping. He does report numbness into left pinky as well as first digit. Question whether could be related to carpal tunnel syndrome as well.   We will do EMG and attempt Zanaflex nightly for relief of symptoms  -We did discuss possibly seeing a hand specialist for further evaluation  - EMG; Future Preventative Care:  Lung cancer screen  Labs  Had colon polyps removed in 2021 he believes, working on obtaining results from colonoscopy. Imaging:  ECHO 12/3/21    Conclusions      Summary   Normal left ventricular systolic function with ejection fraction of 55-60%. No regional wall motion abnormalites are seen. Left ventricular diastolic filling pressure is normal.   Mild mitral regurgitation. Systolic pulmonic artery pressure (SPAP) is normal estimated at 32 mmHg   (Right atrial pressure of 3 mmHg). Return in about 8 weeks (around 6/27/2022), or if symptoms worsen or fail to improve, for Hand pain, Lung screen. HPI  Patient here to establish care. Medical hx of MI in the past with cardiac stent placement in 2018. Follows with Dr. Vika Tavares of cardiology. Apparently, when he had a previous MI he was at work and began having chest pain and became confused. He drove himself to [facility] and he underwent angiogram right away. He had 60% stenosis in OM2 and 75% stenosis in mid vessel. 30% LAD stenosis but not severe. HESHAM placed in Mid OM2. Quit smoking in 2018. Uses ETOH regularly. Drinks about 6 pack of Bud Light daily and has been since his 25s. Lives with Girlfriend. Wife passed away in 2014      Hypertension  This is a chronic problem. The current episode started more than 1 year ago. The problem is unchanged. The problem is controlled. Associated symptoms include neck pain and shortness of breath (exertional). Pertinent negatives include no anxiety, blurred vision, chest pain, headaches, palpitations, peripheral edema or sweats. There are no associated agents to hypertension. Risk factors for coronary artery disease include dyslipidemia, family history, male gender and smoking/tobacco exposure. Past treatments include ACE inhibitors and calcium channel blockers. The current treatment provides significant improvement. There are no compliance problems.   There is no history of angina, kidney disease or CAD/MI. There is no history of chronic renal disease, a hypertension causing med, sleep apnea or a thyroid problem. Hand Pain   There was no injury mechanism. The pain is present in the left wrist and left hand. The quality of the pain is described as stabbing. The pain radiates to the right hand. The pain is at a severity of 4/10. The pain is moderate. The pain has been worsening since the incident. Associated symptoms include muscle weakness and tingling. Pertinent negatives include no chest pain or numbness. He has tried acetaminophen for the symptoms. The treatment provided no relief. ROS  Review of Systems   Constitutional: Negative for chills, fever and unexpected weight change. HENT: Negative for congestion, dental problem and sore throat. Eyes: Negative for blurred vision, pain and discharge. Respiratory: Positive for shortness of breath (exertional). Negative for apnea, cough, chest tightness and wheezing. Cardiovascular: Negative for chest pain, palpitations and leg swelling. Gastrointestinal: Negative for abdominal distention, abdominal pain, constipation and diarrhea. Endocrine: Negative for polydipsia, polyphagia and polyuria. Genitourinary: Negative for dysuria, flank pain, hematuria and urgency. Musculoskeletal: Positive for arthralgias, joint swelling and neck pain. Negative for myalgias. Back pain: right 1st digit and wrist.    Skin: Negative for color change and rash. Neurological: Positive for tingling. Negative for dizziness, light-headedness, numbness and headaches. Hematological: Positive for adenopathy (supraclavicular). Psychiatric/Behavioral: Negative for agitation and behavioral problems. The patient is not nervous/anxious.          HISTORIES  Current Outpatient Medications on File Prior to Visit   Medication Sig Dispense Refill    lisinopril (PRINIVIL;ZESTRIL) 10 MG tablet Take 1 tablet by mouth daily 90 tablet 3    atorvastatin (LIPITOR) 80 MG tablet Take 1 tablet by mouth daily 90 tablet 3    clopidogrel (PLAVIX) 75 MG tablet Take 1 tablet by mouth daily 90 tablet 3    amLODIPine (NORVASC) 5 MG tablet Take 1 tablet by mouth daily 90 tablet 3    nitroGLYCERIN (NITROSTAT) 0.4 MG SL tablet Place 1 tablet under the tongue every 5 minutes as needed for Chest pain up to max of 3 total doses. If no relief after 1 dose, call 911. 25 tablet 5    pantoprazole (PROTONIX) 40 MG tablet Take 40 mg by mouth daily       No current facility-administered medications on file prior to visit. Allergies   Allergen Reactions    Coreg [Carvedilol]      Fatigue     Imdur [Isosorbide Nitrate] Other (See Comments)     headache    Codeine Palpitations       Past Medical History:   Diagnosis Date    CAD (coronary artery disease)     Hyperlipidemia     Hypertension        Patient Active Problem List   Diagnosis    Rotator cuff syndrome of right shoulder    Cubital tunnel syndrome on right    Wrist contusion    Elbow contusion    Lateral epicondylitis (tennis elbow)    Sprain of ligament of lumbosacral joint    Foraminal stenosis of lumbar region    Coronary artery disease involving native coronary artery of native heart without angina pectoris    Essential hypertension    Mixed hyperlipidemia    History of tobacco abuse    Chest pain       Past Surgical History:   Procedure Laterality Date    DIAGNOSTIC CARDIAC CATH LAB PROCEDURE  07/24/2018    FOOT SURGERY      MANDIBLE FRACTURE SURGERY      PTCA  07/24/2018    SHOULDER ARTHROSCOPY      R shoulder and rotator cuff       Social History     Socioeconomic History    Marital status:       Spouse name: Not on file    Number of children: Not on file    Years of education: Not on file    Highest education level: Not on file   Occupational History    Not on file   Tobacco Use    Smoking status: Former Smoker     Packs/day: 1.00     Years: 43.00     Pack years: 43.00 Types: Cigarettes     Quit date: 7/23/2018     Years since quitting: 3.7    Smokeless tobacco: Never Used   Vaping Use    Vaping Use: Never used   Substance and Sexual Activity    Alcohol use: Yes     Comment: 4-5 beers daily    Drug use: Yes     Frequency: 3.0 times per week     Types: Marijuana [place])    Sexual activity: Yes     Partners: Female   Other Topics Concern    Not on file   Social History Narrative    Not on file     Social Determinants of Health     Financial Resource Strain:     Difficulty of Paying Living Expenses: Not on file   Food Insecurity:     Worried About Running Out of Food in the Last Year: Not on file    Triny of Food in the Last Year: Not on file   Transportation Needs:     Lack of Transportation (Medical): Not on file    Lack of Transportation (Non-Medical):  Not on file   Physical Activity:     Days of Exercise per Week: Not on file    Minutes of Exercise per Session: Not on file   Stress:     Feeling of Stress : Not on file   Social Connections:     Frequency of Communication with Friends and Family: Not on file    Frequency of Social Gatherings with Friends and Family: Not on file    Attends Jehovah's witness Services: Not on file    Active Member of [address] [place] [address] [place] or Organizations: Not on file    Attends Club or Organization Meetings: Not on file    Marital Status: Not on file   Intimate Partner Violence:     Fear of Current or Ex-Partner: Not on file    Emotionally Abused: Not on file    Physically Abused: Not on file    Sexually Abused: Not on file   Housing Stability:     Unable to Pay for Housing in the Last Year: Not on file    Number of [place] in the Last Year: Not on file    Unstable Housing in the Last Year: Not on file        Family History   Problem Relation Age of Onset    Heart Attack Mother     Heart Attack Brother        PE  Vitals:    05/02/22 0747   BP: 122/70   Site: Left Upper Arm   Position: Sitting   Cuff Size: Large Adult   Pulse: 64 SpO2: 97%   Weight: 173 lb (78.5 kg)     Estimated body mass index is 27.1 kg/m² as calculated from the following:    Height as of 11/23/21: 5' 7\" (1.702 m). Weight as of this encounter: 173 lb (78.5 kg). Physical Exam  Constitutional:       General: He is not in acute distress. Appearance: Normal appearance. He is not ill-appearing. HENT:      Head: Normocephalic. Right Ear: Tympanic membrane and external ear normal.      Left Ear: Tympanic membrane and external ear normal.      Nose: No congestion or rhinorrhea. Mouth/Throat:      Mouth: Mucous membranes are moist.      Pharynx: Oropharynx is clear. No posterior oropharyngeal erythema. Eyes:      Extraocular Movements: Extraocular movements intact. Conjunctiva/sclera: Conjunctivae normal.      Pupils: Pupils are equal, round, and reactive to light. Cardiovascular:      Rate and Rhythm: Normal rate and regular rhythm. Pulses: Normal pulses. Heart sounds: Normal heart sounds. No murmur heard. Pulmonary:      Effort: Pulmonary effort is normal. No respiratory distress. Breath sounds: Normal breath sounds. No wheezing. Abdominal:      General: Abdomen is flat. Bowel sounds are normal.      Palpations: Abdomen is soft. Tenderness: There is no abdominal tenderness. Hernia: No hernia is present. Musculoskeletal:         General: No swelling. Right hand: Tenderness and bony tenderness present. Decreased range of motion. Decreased strength of thumb/finger opposition and wrist extension. Normal capillary refill. Normal pulse. Arms:       Cervical back: Normal range of motion and neck supple. No tenderness. Right lower leg: No edema. Left lower leg: No edema. Lymphadenopathy:      Cervical: No cervical adenopathy. Skin:     General: Skin is warm and dry. Capillary Refill: Capillary refill takes less than 2 seconds. Neurological:      General: No focal deficit present. Mental Status: He is alert and oriented to person, place, and time. Mental status is at baseline. Cranial Nerves: No cranial nerve deficit. Psychiatric:         Mood and Affect: Mood normal.         Behavior: Behavior normal.         Judgment: Judgment normal.         Immunization History   Administered Date(s) Administered    Tdap (Boostrix, Adacel) 11/06/2017       Health Maintenance   Topic Date Due    COVID-19 Vaccine (1) Never done    Depression Screen  Never done    Diabetes screen  Never done    Colorectal Cancer Screen  Never done    Shingles vaccine (1 of 2) Never done    Low dose CT lung screening  Never done    Lipids  10/22/2019    Hepatitis C screen  05/02/2023 (Originally 1/27/1979)    HIV screen  05/02/2023 (Originally 1/27/1976)    Flu vaccine (Season Ended) 09/01/2022    Potassium  11/10/2022    Creatinine  11/10/2022    DTaP/Tdap/Td vaccine (2 - Td or Tdap) 11/06/2027    Hepatitis A vaccine  Aged Out    Hepatitis B vaccine  Aged Out    Hib vaccine  Aged Out    Meningococcal (ACWY) vaccine  Aged Out    Pneumococcal 0-64 years Vaccine  Aged Out       PSH, PMH, SH and FH reviewed and noted. Recent and past labs, tests and consults also reviewed. Recent or new meds also reviewed. SHAN Zepeda CNP    This dictation was generated by voice recognition computer software. Although all attempts are made to edit the dictation for accuracy, there may be errors in the transcription that are not intended. Low Dose CT (LDCT) Lung Screening criteria met:     Age 50-77(Medicare) or 50-80 (USPSTF)   Pack year smoking >20   Still smoking or less than 15 year since quit   No sign or symptoms of lung cancer   > 11 months since last LDCT     Risks and benefits of lung cancer screening with LDCT scans discussed:    Significance of positive screen - False-positive LDCT results often occur. 95% of all positive results do not lead to a diagnosis of cancer.  Usually further imaging can resolve most false-positive results; however, some patients may require invasive procedures. Over diagnosis risk - 10% to 12% of screen-detected lung cancer cases are over diagnosedthat is, the cancer would not have been detected in the patient's lifetime without the screening. Need for follow up screens annually to continue lung cancer screening effectiveness     Risks associated with radiation from annual LDCT- Radiation exposure is about the same as for a mammogram, which is about 1/3 of the annual background radiation exposure from everyday life. Starting screening at age 54 is not likely to increase cancer risk from radiation exposure. Patients with comorbidities resulting in life expectancy of < 10 years, or that would preclude treatment of an abnormality identified on CT, should not be screened due to lack of benefit.     To obtain maximal benefit from this screening, smoking cessation and long-term abstinence from smoking is critical

## 2022-05-03 LAB
ESTIMATED AVERAGE GLUCOSE: 119.8 MG/DL
HBA1C MFR BLD: 5.8 %

## 2022-05-03 NOTE — RESULT ENCOUNTER NOTE
A1c is 5.8. This is considered prediabetes. Nothing to do for right now other than monitor.   I would encourage him to implement lifestyle modifications such as better dietary intake healthier foods and watch carb intake

## 2022-05-03 NOTE — RESULT ENCOUNTER NOTE
PSA is normal    Cholesterol panel looks pretty good. Elevated LDL at 119. Kidney panel shows normal kidney function with no electrolyte abnormality. There is a small elevation liver function test.  This is likely related to alcohol use daily.     CBC is normal with no anemia

## 2022-06-08 ENCOUNTER — HOSPITAL ENCOUNTER (OUTPATIENT)
Dept: CT IMAGING | Age: 62
Discharge: HOME | End: 2022-06-08
Payer: COMMERCIAL

## 2022-06-08 ENCOUNTER — HOSPITAL ENCOUNTER (OUTPATIENT)
Dept: NEUROLOGY | Age: 62
Discharge: HOME | End: 2022-06-08
Payer: COMMERCIAL

## 2022-06-08 DIAGNOSIS — G56.03 BILATERAL CARPAL TUNNEL SYNDROME: Primary | ICD-10-CM

## 2022-06-08 DIAGNOSIS — Z87.891 PERSONAL HISTORY OF TOBACCO USE: ICD-10-CM

## 2022-06-08 DIAGNOSIS — M79.641 RIGHT HAND PAIN: ICD-10-CM

## 2022-06-08 PROCEDURE — 95909 NRV CNDJ TST 5-6 STUDIES: CPT

## 2022-06-08 PROCEDURE — 71271 CT THORAX LUNG CANCER SCR C-: CPT

## 2022-06-08 PROCEDURE — 95886 MUSC TEST DONE W/N TEST COMP: CPT

## 2022-06-08 NOTE — RESULT ENCOUNTER NOTE
EMG shows bilateral carpal tunnel. Right greater than left. Can refer to Dr. Vicky Dee of hand surgery for further evaluation and surgical correction if he would like to.

## 2022-06-08 NOTE — PROCEDURES
Test Date:  2022    Patient: Vance Broussard : 1960 Physician: Petey Smith DO   Sex: Male ID#:  Ref Phys: SHAN Silverman-CNP     Patient Complaints:  Patient is a 65year-old male who presents with pain weakness in the right hand onset Feb    Patient History / Exam:  PMH: no endocrine disease. + right shoulder surgery '97 no cervical surgery     NCV & EMG Findings:  Evaluation of the right median (APB) motor nerve showed prolonged distal onset latency (4.4 ms). The left median sensory and the right median sensory nerves showed prolonged distal peak latency (L3.9, R4.1 ms) and decreased conduction velocity (L36, R34 m/s). The right ulnar sensory nerve showed reduced amplitude (9 µV). All remaining nerves (as indicated in the following tables) were within normal limits. All examined muscles (as indicated in the following table) showed no evidence of electrical instability. Impression: study is consistent with bilateral carpal  tunnel syndrome, right moderate severity, left mild.  No evidence of an acute radiculopathy or other entrapment neuropathy         Petey Smith DO        Nerve Conduction Studies  Motor Nerve Results      Latency Amplitude F-Lat Segment Distance CV Comment   Site (ms) Norm (mV) Norm (ms)  (cm) (m/s) Norm    Left Median (APB) Motor   Wrist 4.2  < 4.2 5.3  > 5.0         Right Median (APB) Motor   Wrist 4.4  < 4.2 5.0  > 5.0         Elbow 8.1 - 5.1 -  Elbow-Wrist 20 54  > 50    Right Ulnar (ADM) Motor   Wrist 3.3  < 4.2 9.5  > 3.0         Bel Elbow 7.3 - 8.2 -  Bel Elbow-Wrist 23 58  > 50    Abv Elbow 8.7 - 8.4 -  Abv Elbow-Bel Elbow 8 57  > 48      Sensory Nerve Results      Latency (Peak) Amplitude (P-P) Segment Distance CV Comment   Site (ms) Norm (µV) Norm  (cm) (m/s) Norm    Left Median Sensory   Wrist-Dig II 3.9  < 3.6 11  > 10 Wrist-Dig II 14 36  > 39    Right Median Sensory   Wrist-Dig II 4.1  < 3.6 15  > 10 Wrist-Dig II 14 34  > 39    Left Ulnar Sensory   Wrist-Dig V 3.4  < 3.7 50  > 15 Wrist-Dig V 14 41  > 38    Right Ulnar Sensory   Wrist-Dig V 3.5  < 3.7 9  > 15 Wrist-Dig V 14 40  > 38        Electromyography     Side Muscle Nerve Root Ins Act Fibs Psw Amp Dur Poly Recrt Int Lavonna Sigala Comment   Right Deltoid Axillary C5-C6 Nml Nml Nml Nml Nml 0 Nml Nml    Right Biceps Musculocut C5-C6 Nml Nml Nml Nml Nml 0 Nml Nml    Right Triceps Radial C6-C8 Nml Nml Nml Nml Nml 0 Nml Nml    Right Brachiorad Radial C5-C6 Nml Nml Nml Nml Nml 0 Nml Nml    Right Pronator Teres Median C6-C7 Nml Nml Nml Nml Nml 0 Nml Nml    Right EIP Post Interosseous,  R... C7-C8 Nml Nml Nml Nml Nml 0 Nml Nml    Right APB Median C8-T1 Nml Nml Nml Nml Nml 0 Nml Nml    Right FDI Ulnar C8-T1 Nml Nml Nml Nml Nml 0 Nml Nml    Right Cervical Paraspinal (Uppe. .. Rami C1-C3 Nml Nml Nml         Right Cervical Paraspinal (Mid) Rami C4-C6 Nml Nml Nml         Right Cervical Paraspinal (Juan Carlos Blade. .. Rami C7-C8 Nml Nml Nml         Left Deltoid Axillary C5-C6 Nml Nml Nml Nml Nml 0 Nml Nml    Left Biceps Musculocut C5-C6 Nml Nml Nml Nml Nml 0 Nml Nml    Left Triceps Radial C6-C8 Nml Nml Nml Nml Nml 0 Nml Nml    Left Brachiorad Radial C5-C6 Nml Nml Nml Nml Nml 0 Nml Nml    Left Pronator Teres Median C6-C7 Nml Nml Nml Nml Nml 0 Nml Nml    Left EIP Post Interosseous,  R... C7-C8 Nml Nml Nml Nml Nml 0 Nml Nml    Left APB Median C8-T1 Nml Nml Nml Nml Nml 0 Nml Nml    Left FDI Ulnar C8-T1 Nml Nml Nml Nml Nml 0 Nml Nml    Left Cervical Paraspinal (Uppe. .. Rami C1-C3 Nml Nml Nml         Left Cervical Paraspinal (Mid) Rami C4-C6 Nml Nml Nml         Left Cervical Paraspinal (Juan Carlos Blade. ..  Rami C7-C8 Nml Nml Nml             Electronically signed by Shade Bey DO on 6/8/2022 at 7:58 AM

## 2022-06-08 NOTE — RESULT ENCOUNTER NOTE
Small calcified granuloma found on left lower lobe. This is not concerning at this time. We will repeat LDCT 12 months.   No suspicious lesions found

## 2022-08-03 ENCOUNTER — OFFICE VISIT (OUTPATIENT)
Dept: ORTHOPEDIC SURGERY | Age: 62
End: 2022-08-03
Payer: COMMERCIAL

## 2022-08-03 VITALS — BODY MASS INDEX: 27.15 KG/M2 | HEIGHT: 67 IN | WEIGHT: 173 LBS | RESPIRATION RATE: 16 BRPM

## 2022-08-03 DIAGNOSIS — G56.03 BILATERAL CARPAL TUNNEL SYNDROME: Primary | ICD-10-CM

## 2022-08-03 PROCEDURE — 99244 OFF/OP CNSLTJ NEW/EST MOD 40: CPT | Performed by: ORTHOPAEDIC SURGERY

## 2022-08-03 NOTE — Clinical Note
Dear  Zulema Mitchell, APRN - CNP,  Thank you very much for your referral or Mr. Soy Martin to me for evaluation and treatment of his Hand & Wrist condition. I appreciate your confidence in me and thank you for allowing me the opportunity to care for your patients. If I can be of any further assistance to you on this or any other patient, please do not hesitate to contact me. Sincerely,  Toy Wright.  Shane Vazquez MD

## 2022-08-03 NOTE — PROGRESS NOTES
Mr. Seth Gonzalez is a 65 y.o. right handed man  who is seen today in Hand Surgical Consultation at the request of SHAN Torres CNP. He presents today regarding Right greater than Left  symptoms which have been present for approximately 6 months. A history of antecedent trauma or injury is Absent. He reports symptoms to include moderate numbness & tingling in the Whole Hand. Neurologic symptoms do Seldom awaken him from sleep. He reports mild pain located in the palmar both wrists and both forearms. Symptoms show no change over time. He also presents today regarding right symptoms which have been present for approximately 6 months. A history of antecedent trauma or injury is Absent. He reports symptoms to include moderate pain and stiffness with no popping, catching or locking of the right Index Finger and Small Finger. Finger symptoms are Frequently worse in the morning or overnight. He reports marked pain located at the base of the symptomatic finger(s). Symptoms are worsening over time. Previous treatment has included conservative measures and avoidance of bothersome tasks. He does not claim relation of his symptoms to his required work activities. He has undergone electrodiagnostic testing. I have today reviewed with Seth Gonzalez the clinically relevant, past medical history, medications, allergies,  family history, social history, and Review Of Systems & I have documented any details relevant to today's presenting complaints in my history above. Mr. Sayda Gillette self-reported past medical history, medications, allergies,  family history, social history, and Review Of Systems have been scanned into the chart under the \"Media\" tab. Physical Exam:  Mr. Sayda Gillette most recent vitals:  Vitals  Resp: 16  Height: 5' 7\" (170.2 cm)  Weight: 173 lb (78.5 kg)    He is well nourished, oriented to person, place & time.   He demonstrates appropriate mood and affect as well as normal gait and station. Skin: Normal in appearance, Normal Color, and Free of Lesions Bilaterally   Digital range of motion is limited by pain Index Finger and Small Finger on the Right, normal on the Left  Wrist range of motion is without significant limitation bilaterally  Sensation is subjectively tingling in the Whole Hand and objectively present in the same distribution bilaterally. All other digits show normal sensation  Vascular examination reveals normal and good capillary refill bilaterally  Swelling is mild in the symptomatic fingers on the Right, greater than Left  There is no evidence of gross joint instability bilaterally. Muscular strength is clinically appropriate bilaterally. Examination for Carpal Tunnel Syndrome shows Carpal Tunnel Compression Test to be Mildly Positive on the right & Mildly Positive on the left. The patient displays moderate baseline symptoms to potentially confound the exam.  The thenar musculature is not atrophied & weakened. Examination for Stenosing Tenosynovitis demonstrates moderate tenderness, thickening & nodularity at the A-1 pulley(s) of the Right Index Finger and Small Finger. There is a palpable Nota's Node. There is moderate triggering on active flexion with pain. No other digits demonstrate evidence of Stenosing Tenosynovitis. Examination of the Metacarpo-Phalangeal Joints of the Index Finger and Small Finger demonstrate no swelling, no effusion, and there is no crepitance with range of motion. Review of Electrodiagnostic Testing:  Electrodiagnostic Studies performed by another Physician outside of my practice were Personally Reviewed & Interpreted by myself today.     Test performed on: 6/8/22    NERVE CONDUCTION STUDY:  RIGHT   Median Nerve: Sensory Latency: 4.1  Motor Latency: 4.4  Ulnar Nerve:  Conduction Velocity:  57    LEFT  Median Nerve: Sensory Latency: 3.9  Motor Latency: 4.2  Ulnar Nerve:  Conduction Velocity: EMG:  RIGHT  Normal    LEFT  Normal    My Interpretation: This study is consistent with: Moderate RIGHT Median Nerve Entrapment at the Carpal Tunnel and Moderate LEFT Median Nerve Entrapment at the Carpal Tunnel      Impression:  Mr. Alberto Vasquez has developed carpal tunnel syndrome and stenosing tenosynovitis, which is currently exacerbated, and presents requesting further treatment. Plan:  I have had a thorough discussion with Mr. Alberto Vasquez regarding the treatment options available for his initially presenting carpal tunnel syndrome, which is causing him significant  limitations. I have outlined for Mr. Alberto Vasquez the benefits and consequences of the various treatment modalities, including the fact that surgical treatment is the only modality which is reasonably expected to provide long lasting or permanent resolution of his symptoms. Based upon our current discussion and a reasonable understating of the options available to him, Mr. Alberto Vasquez has selected to proceed with surgical Carpal Tunnel Release. I have discussed the details of the surgical procedure, the pre, monik and postoperative concerns and the appropriate expectations after surgery with Mr. Alberto Vasquez today. He was given the opportunity to ask questions, voiced an understanding of the procedure, and he did wish to proceed with Right Carpal Tunnel Release. I had an extensive discussion with Mr. Alberto Vasquez (and any family members present with him today) regarding the natural history, etiology, and long term consequences of this problem. We have mutually selected a surgical treatment plan  and, in my opinion, surgical intervention is indicated at this time. I have discussed with him the potential complications, limitations, expectations, alternatives, and risks of Carpal Tunnel Release. He has had full opportunity to ask his questions. I have answered them all to his satisfaction.  I feel that Mr. Shaunna Epley Osiris (and any family members present with him today) do understand our discussion today and he has provided informed consent for Right Carpal Tunnel Release. I have had a thorough discussion with Mr. Isidoro Kerns regarding the treatment options available for his initially presenting Index Finger and Small Finger stenosing tenosynovitis, which is causing him significant  limitations. I have outlined for Mr. Isidoro Kerns the benefits and consequences of the various treatment modalities, including the fact that surgical treatment is the only modality which is reasonably expected to provide long lasting or permanent resolution of his symptoms. Based upon our current discussion and a reasonable understating of the options available to him, Mr. Isidoro Kerns has selected to proceed with surgical Index Finger and Small Finger Trigger Finger Release. I have discussed the details of the surgical procedure, the pre, monik and postoperative concerns and the appropriate expectations after surgery with Mr. Isidoro Kerns today. He was given the opportunity to ask questions, voiced an understanding of the procedure, and he did wish to proceed with Right Index Finger and Small Finger Trigger Finger Release (A1 Pulley Release). I had an extensive discussion with Mr. Isidoro Kerns (and any family members present with him today) regarding the natural history, etiology, and long term consequences of this problem. We have mutually selected a surgical treatment plan  and, in my opinion, surgical intervention is indicated at this time. I have discussed with him the potential complications, limitations, expectations, alternatives, and risks of Trigger Finger Release. He has had full opportunity to ask his questions. I have answered them all to his satisfaction.  I feel that Mr. Isidoro Kerns (and any family members present with him today) do understand our discussion today and he has provided informed consent for Right Index Finger and Small Finger Trigger Finger Release (A1 Pulley Release). I have also discussed with Mr. Luba Love the other treatment options available to him for this condition. We have today selected to proceed with Surgical treatment. He has voiced and  understanding that there are other less aggressive treatment options which are available in this situation, albeit possibly less efficacious or durable, and he is comfortable with the plan that he has chosen. I have explained to Mr. Luba Love that despite successful treatment (surgical or otherwise) of his current presenting condition, that due to his coexistent conditions (both diagnosed and undiagnosed), that he is likely to have some permanent residual symptoms related to these conditions that do not improve long term. I have also explained that maximal recovery of function & symptom improvement may take a full year or longer to realize. He voiced a clear understanding of this. Mr. Luba Love has been given a full verbal list of instructions and precautions related to his present condition. I have asked him to followup with me in the office at the prescribed time. He is also specifically requested to call or return to the office sooner if his symptoms change or worsen prior to the next scheduled appointment.

## 2022-08-03 NOTE — LETTER
CONSENT TO OPERATION  AND/OR OTHER PROCEDURE(S)          PATIENT : Lafayette Boeck OF BIRTH:  1960      DATE : 8/3/22          1. I request and consent that Dr. Jason Martinez,  and/or his associates or assistants perform an operation and/or procedures on the above patient at  [place] [address], to treat the condition(s) which appear indicated by the diagnostic studies already performed. I have been told that in general terms the nature, purpose and reasonable expectations of the operation and/or procedure(s) are:      right Carpal Tunnel Release &   right Index Finger and Small Finger Trigger Finger Release       2. It has been explained to me by the informing physician that during the course of the operation and/or procedure(s) unforeseen conditions may be revealed that necessitate an extension of the original operation and/or procedure(s) or different operation and/or procedures than those set forth in Paragraph 1. I therefore authorize and request that my physician and/or his associates or assistants perform such operations and/or procedures as are necessary and desirable in the exercise of professional judgment. The authority granted under this Paragraph 2 shall extend to all conditions that require treatment and are known to my physician at the time the operation is commenced. 3. I have been made aware by the informing physician of certain risks and consequences that are associated with the operation and/or procedure(s) described in Paragraph 1, the reasonable alternative methods or treatment, the possible consequences, the possibility that the operation and/or procedure(s) may be unsuccessful and the possibility of complications.   I understand the reasonably known risks to be:      - Bleeding  - Infection  - Poor Healing  - Possible Damage to Nerve, Vessel, Tendon/Muscle or Bone  - Need for further Treatment/Surgery  - Stiffness  - Pain  - Residual or Recurrent Symptoms  - Anesthetic and/or Medical Risks  - We have discussed the specific limitations and risks of hospital and/or office based treatment at this time due to the COVID-19 pandemic                I have been counseled about the risks of cynthia Covid-19 in the monik-operative and post-operative periods related to this procedure. I have been made aware that cynthia Covid-19 around the time of a surgical procedure may worsen my prognosis for recovering from the virus and lend to a higher morbidity and or mortality risk. With this knowledge, I have requested to proceed with the procedure as scheduled. 4. I have also been informed by the informing physician that there are other risks from both known and unknown causes that are attendant to the performance of any surgical procedure. I am aware that the practice of medicine and surgery is not an exact science, and that no guarantees have been made to me concerning the results of the operation and/or procedure(s). 5. I   CONSENT / REFUSE CONSENT  (strike the phrase that does not apply) to the taking of photographs before, during and/or after the operation or procedure for scientific/educational purposes. 6. I consent to the administration of anesthesia and to the use of such anesthetics as may be deemed advisable by the anesthesiologist who has been engaged by me or my physician. 7. I certify that I have read and understand the above consent to operation and/or other procedure(s); that the explanations therein referred to were made to me by the informing physician in advance of my signing this consent; that all blanks or statements requiring insertion or completion were filled in and inapplicable paragraphs, if any, were stricken before I signed; and that all questions asked by me about the operation and/or procedure(s) which I have consented to have been fully answered in a satisfactory manner. _______________________           8/3/22                              Witness     Signature Of Patient         Date        Wellington Sharif. Rashad                                                 Informing Physician                                           Signature of Informing Physician                              If patient is unable to sign or is a minor, complete one of the following:    (A)  Patient is a minor   years of age. (B)  Patient is unable to sign because: The undersigned represents that he or she is duly authorized to execute this consent for and on behalf of the above named patient. Witness               o  Parent  o  Guardian   o  Spouse       o  Other (specify)                                           Patient Name: Tammi Piañ  Patient YOB: 1960  Dr. Melly Tang' Return To Work Policy  Regarding your ability to return to work after surgery or injury, Dr. Melly Tang will not state that any patient is off of work or cannot work at all. He will place you on restrictions after your surgical procedure or injury. Depending on the details of your particular situation, Dr. Melly Tang may state that you will have either light use or no use of your hand for a specific number of weeks. It is your obligation to communicate with your employer regarding your restrictions. It is your employer's decision as to whether they will accommodate your restrictions (i.e. allow you to come to work in your restricted capacity) or to not allow you to return to work under your restrictions. Dr. Melly Tang does not participate in making this decision and cannot influence your employer regarding their decision. If you do not communicate your restrictions to your employer, or if you do not present to work as you are scheduled to, Dr. Melly Tang will not provide an 'excuse' to explain your absence.   A doctors note, or official forms (BWC, FMLA, etc.) will be filled out, upon request, to indicate your date of surgery and your restrictions as stated above. Dr. Stephenie Cruz' Narcotic Policy  Patients will only be prescribed narcotics after surgical procedures or significant injury. Not all procedures cause pain great enough to require Narcotics and thus, not all patients will receive prescriptions after surgical procedures or injuries. Narcotics are never prescribed for chronic conditions. Narcotics are never prescribed for use longer than one week at a time. Refills are only granted in unusual circumstances and only at Dr. Kylah Sanchez discretion. Patients who are receiving narcotic medication from another physician or who are under pain management contracts will not be given a prescription for narcotics for any reason. Surgery Arrival Time:  You have been advised of the START TIME for your surgery as well as the ARRIVAL TIME at which you need to arrive at the surgery facility. Please understand that there is a certain amount of preparation which takes place at the surgery facility prior to the start of your surgery. If you arrive later than your scheduled ARRIVAL TIME, it may be necessary to cancel your surgery for that day and reschedule your procedure due to lack of adequate time for pre-surgery preparations. Thank you for being on time for your arrival.    I have read the above policies and understand that by agreeing to proceed with treatment by Dr. Mirna Corral and his team, that I am agreeing to abide by these policies.   Patient Name:  Gaston Abebe    Patient Signature:  _____________________________    Brian Galan Date:   8/3/22

## 2022-12-06 ENCOUNTER — TELEPHONE (OUTPATIENT)
Dept: CARDIOLOGY CLINIC | Age: 62
End: 2022-12-06

## 2022-12-06 NOTE — TELEPHONE ENCOUNTER
Refill  atorvastatin (LIPITOR) 80 MG tablet   [facility] - [place], [place] - [address]

## 2023-01-03 RX ORDER — AMLODIPINE BESYLATE 5 MG/1
5 TABLET ORAL DAILY
Qty: 90 TABLET | Refills: 0 | Status: SHIPPED | OUTPATIENT
Start: 2023-01-03

## 2023-01-03 RX ORDER — ATORVASTATIN CALCIUM 80 MG/1
80 TABLET, FILM COATED ORAL DAILY
Qty: 90 TABLET | Refills: 0 | Status: SHIPPED | OUTPATIENT
Start: 2023-01-03

## 2023-01-03 RX ORDER — LISINOPRIL 10 MG/1
10 TABLET ORAL DAILY
Qty: 90 TABLET | Refills: 0 | Status: SHIPPED | OUTPATIENT
Start: 2023-01-03

## 2023-01-03 RX ORDER — CLOPIDOGREL BISULFATE 75 MG/1
75 TABLET ORAL DAILY
Qty: 90 TABLET | Refills: 0 | Status: SHIPPED | OUTPATIENT
Start: 2023-01-03

## 2023-02-13 ENCOUNTER — OFFICE VISIT (OUTPATIENT)
Dept: FAMILY MEDICINE CLINIC | Age: 63
End: 2023-02-13
Payer: COMMERCIAL

## 2023-02-13 ENCOUNTER — TELEPHONE (OUTPATIENT)
Dept: FAMILY MEDICINE CLINIC | Age: 63
End: 2023-02-13

## 2023-02-13 VITALS — BODY MASS INDEX: 27.25 KG/M2 | SYSTOLIC BLOOD PRESSURE: 136 MMHG | WEIGHT: 174 LBS | DIASTOLIC BLOOD PRESSURE: 84 MMHG

## 2023-02-13 DIAGNOSIS — M25.512 CHRONIC LEFT SHOULDER PAIN: Primary | ICD-10-CM

## 2023-02-13 DIAGNOSIS — G89.29 CHRONIC LEFT SHOULDER PAIN: Primary | ICD-10-CM

## 2023-02-13 DIAGNOSIS — Z12.11 COLON CANCER SCREENING: ICD-10-CM

## 2023-02-13 PROCEDURE — 3079F DIAST BP 80-89 MM HG: CPT

## 2023-02-13 PROCEDURE — 99214 OFFICE O/P EST MOD 30 MIN: CPT

## 2023-02-13 PROCEDURE — 3075F SYST BP GE 130 - 139MM HG: CPT

## 2023-02-13 RX ORDER — TIZANIDINE 4 MG/1
4 TABLET ORAL NIGHTLY
Qty: 30 TABLET | Refills: 0 | Status: SHIPPED | OUTPATIENT
Start: 2023-02-13

## 2023-02-13 RX ORDER — PREDNISONE 20 MG/1
20 TABLET ORAL 2 TIMES DAILY
Qty: 10 TABLET | Refills: 0 | Status: SHIPPED | OUTPATIENT
Start: 2023-02-13 | End: 2023-02-18

## 2023-02-13 SDOH — ECONOMIC STABILITY: FOOD INSECURITY: WITHIN THE PAST 12 MONTHS, YOU WORRIED THAT YOUR FOOD WOULD RUN OUT BEFORE YOU GOT MONEY TO BUY MORE.: NEVER TRUE

## 2023-02-13 SDOH — ECONOMIC STABILITY: FOOD INSECURITY: WITHIN THE PAST 12 MONTHS, THE FOOD YOU BOUGHT JUST DIDN'T LAST AND YOU DIDN'T HAVE MONEY TO GET MORE.: NEVER TRUE

## 2023-02-13 SDOH — ECONOMIC STABILITY: INCOME INSECURITY: HOW HARD IS IT FOR YOU TO PAY FOR THE VERY BASICS LIKE FOOD, HOUSING, MEDICAL CARE, AND HEATING?: NOT HARD AT ALL

## 2023-02-13 SDOH — ECONOMIC STABILITY: HOUSING INSECURITY
IN THE LAST 12 MONTHS, WAS THERE A TIME WHEN YOU DID NOT HAVE A STEADY PLACE TO SLEEP OR SLEPT IN A SHELTER (INCLUDING NOW)?: NO

## 2023-02-13 ASSESSMENT — PATIENT HEALTH QUESTIONNAIRE - PHQ9
10. IF YOU CHECKED OFF ANY PROBLEMS, HOW DIFFICULT HAVE THESE PROBLEMS MADE IT FOR YOU TO DO YOUR WORK, TAKE CARE OF THINGS AT HOME, OR GET ALONG WITH OTHER PEOPLE: 0
SUM OF ALL RESPONSES TO PHQ9 QUESTIONS 1 & 2: 0
SUM OF ALL RESPONSES TO PHQ QUESTIONS 1-9: 3
1. LITTLE INTEREST OR PLEASURE IN DOING THINGS: 0
2. FEELING DOWN, DEPRESSED OR HOPELESS: 0
6. FEELING BAD ABOUT YOURSELF - OR THAT YOU ARE A FAILURE OR HAVE LET YOURSELF OR YOUR FAMILY DOWN: 0
9. THOUGHTS THAT YOU WOULD BE BETTER OFF DEAD, OR OF HURTING YOURSELF: 0
3. TROUBLE FALLING OR STAYING ASLEEP: 3
7. TROUBLE CONCENTRATING ON THINGS, SUCH AS READING THE NEWSPAPER OR WATCHING TELEVISION: 0
SUM OF ALL RESPONSES TO PHQ QUESTIONS 1-9: 3
SUM OF ALL RESPONSES TO PHQ QUESTIONS 1-9: 3
5. POOR APPETITE OR OVEREATING: 0
SUM OF ALL RESPONSES TO PHQ QUESTIONS 1-9: 3
8. MOVING OR SPEAKING SO SLOWLY THAT OTHER PEOPLE COULD HAVE NOTICED. OR THE OPPOSITE, BEING SO FIGETY OR RESTLESS THAT YOU HAVE BEEN MOVING AROUND A LOT MORE THAN USUAL: 0
4. FEELING TIRED OR HAVING LITTLE ENERGY: 0

## 2023-02-13 ASSESSMENT — ENCOUNTER SYMPTOMS
BACK PAIN: 0
EYES NEGATIVE: 1
RESPIRATORY NEGATIVE: 1

## 2023-02-13 NOTE — PROGRESS NOTES
[facility]  2023    Reyna Quarles (:  1960) is a 59 y.o. male, here for evaluation of the following medical concerns:    Chief Complaint   Patient presents with    Arm Pain     Left side shoulder and neck   On going for about a year pt said its progressively getting worse and he can not sleep at night         ASSESSMENT/ PLAN  1. Chronic left shoulder pain  -Acute on chronic condition. Concerning for supraspinatus injury with tendinitis versus partial tear. Other concern would be other rotator cuff injury. No pain with palpation along the infraspinatus or teres major minor.  -Difficulty with range of motion with abduction, pushing and moving hand to the middle to small back. -Referral to orthopedics  - predniSONE (DELTASONE) 20 MG tablet; Take 1 tablet by mouth 2 times daily for 5 days  Dispense: 10 tablet; Refill: 0  - [address], Orthopedics and Sports Medicine (Hip; Knee; Shoulder), [facility]  - tiZANidine (ZANAFLEX) 4 MG tablet; Take 1 tablet by mouth nightly  Dispense: 30 tablet; Refill: 0    2. Colon cancer screening  -Patient is past due for colon cancer screening. Previous polyps removed in 2018  -Patient declines colon cancer screening at this time due to finances. Return in about 3 months (around 2023) for Annual, Blood work. Alberto Hudson MARES  Patient is here for left neck and shoulder pain. Originally started in his neck and now has progressed to shoulder. He does report difficulty with range of motion. States that pain is typically about a 6 out of 10. He has been using 800 mg of ibuprofen daily with mild relief in symptoms. He does report aggravation at nighttime especially when lying on his left shoulder. He has difficulty grabbing his wallet out of his pocket, pushing objects or moving things away from his body. Denies any pain with pulling. Arm Pain   The incident occurred more than 1 week ago. There was no injury mechanism.  The pain is present in the left shoulder. The quality of the pain is described as aching and burning. The pain is at a severity of 6/10. The pain is moderate. Pertinent negatives include no chest pain, numbness or tingling. The symptoms are aggravated by lifting and movement. He has tried NSAIDs (taking 800 mg daily.) for the symptoms. The treatment provided no relief. ROS  Review of Systems   Constitutional: Negative. HENT: Negative. Eyes: Negative. Respiratory: Negative. Cardiovascular:  Negative for chest pain, palpitations and leg swelling. Endocrine: Negative. Genitourinary: Negative. Musculoskeletal:  Positive for arthralgias (Left shoulder), neck pain and neck stiffness. Negative for back pain. Neurological:  Negative for tingling and numbness. Hematological: Negative. Psychiatric/Behavioral: Negative. HISTORIES  Current Outpatient Medications on File Prior to Visit   Medication Sig Dispense Refill    lisinopril (PRINIVIL;ZESTRIL) 10 MG tablet Take 1 tablet by mouth daily 90 tablet 0    amLODIPine (NORVASC) 5 MG tablet Take 1 tablet by mouth daily 90 tablet 0    clopidogrel (PLAVIX) 75 MG tablet Take 1 tablet by mouth daily 90 tablet 0    atorvastatin (LIPITOR) 80 MG tablet Take 1 tablet by mouth daily 90 tablet 0    nitroGLYCERIN (NITROSTAT) 0.4 MG SL tablet Place 1 tablet under the tongue every 5 minutes as needed for Chest pain up to max of 3 total doses. If no relief after 1 dose, call 911. 25 tablet 5    pantoprazole (PROTONIX) 40 MG tablet Take 40 mg by mouth daily       No current facility-administered medications on file prior to visit.       Allergies   Allergen Reactions    Codeine Palpitations and Other (See Comments)     \"Passed out, ended up in ICU\"    Coreg [Carvedilol]      Fatigue     Imdur [Isosorbide Nitrate] Other (See Comments)     headache     Past Medical History:   Diagnosis Date    CAD (coronary artery disease)     Hyperlipidemia     Hypertension Patient Active Problem List   Diagnosis    Rotator cuff syndrome of right shoulder    Cubital tunnel syndrome on right    Wrist contusion    Elbow contusion    Lateral epicondylitis (tennis elbow)    Sprain of ligament of lumbosacral joint    Foraminal stenosis of lumbar region    Coronary artery disease involving native coronary artery of native heart without angina pectoris    Essential hypertension    Mixed hyperlipidemia    History of tobacco abuse    Chest pain     Past Surgical History:   Procedure Laterality Date    DIAGNOSTIC CARDIAC CATH LAB PROCEDURE  2018    FOOT SURGERY      MANDIBLE FRACTURE SURGERY      PTCA  2018    SHOULDER ARTHROSCOPY      R shoulder and rotator cuff     Social History     Socioeconomic History    Marital status:      Spouse name: Not on file    Number of children: Not on file    Years of education: Not on file    Highest education level: Not on file   Occupational History    Not on file   Tobacco Use    Smoking status: Former     Packs/day: 1.00     Years: 43.00     Pack years: 43.00     Types: Cigarettes     Quit date: 2018     Years since quittin.5    Smokeless tobacco: Never   Vaping Use    Vaping Use: Never used   Substance and Sexual Activity    Alcohol use: Yes     Comment: 4-5 beers daily    Drug use: Yes     Frequency: 3.0 times per week     Types: Marijuana Estil Catena)    Sexual activity: Yes     Partners: Female   Other Topics Concern    Not on file   Social History Narrative    Not on file     Social Determinants of Health     Financial Resource Strain: Low Risk     Difficulty of Paying Living Expenses: Not hard at all   Food Insecurity: No Food Insecurity    Worried About [address] [facility] in the Last Year: Never true    [address] [facility] [address]  in the Last Year: Never true   Transportation Needs: Unknown    Lack of Transportation (Medical): Not on file    Lack of Transportation (Non-Medical):  No   Physical Activity: Not on file   Stress: Not on file Social Connections: Not on file   Intimate Partner Violence: Not on file   Housing Stability: Unknown    Unable to Pay for Housing in the Last Year: Not on file    Number of [place] in the Last Year: Not on file    Unstable Housing in the Last Year: No      Family History   Problem Relation Age of Onset    Heart Attack Mother     Heart Attack Brother        PE  Vitals:    02/13/23 0931 02/13/23 0933   BP: (!) 142/100 136/84   Site: Right Upper Arm Left Upper Arm   Position: Sitting Sitting   Cuff Size: Medium Adult Medium Adult   Weight: 174 lb (78.9 kg)      Estimated body mass index is 27.25 kg/m² as calculated from the following:    Height as of 8/3/22: 5' 7\" (1.702 m). Weight as of this encounter: 174 lb (78.9 kg). Physical Exam  Constitutional:       Appearance: Normal appearance. He is normal weight. HENT:      Mouth/Throat:      Mouth: Mucous membranes are moist.      Pharynx: Oropharynx is clear. Eyes:      Extraocular Movements: Extraocular movements intact. Pupils: Pupils are equal, round, and reactive to light. Cardiovascular:      Rate and Rhythm: Normal rate and regular rhythm. Pulses: Normal pulses. Heart sounds: Normal heart sounds. Pulmonary:      Effort: Pulmonary effort is normal.      Breath sounds: Normal breath sounds. Abdominal:      General: Abdomen is flat. Bowel sounds are normal.      Palpations: Abdomen is soft. Musculoskeletal:      Left shoulder: Tenderness present. No bony tenderness or crepitus. Decreased range of motion. Decreased strength. Cervical back: Normal range of motion and neck supple. Skin:     General: Skin is warm and dry. Neurological:      Mental Status: He is alert. SHAN Yoon CNP    This dictation was generated by voice recognition computer software. Although all attempts are made to edit the dictation for accuracy, there may be errors in the transcription that are not intended.

## 2023-02-13 NOTE — TELEPHONE ENCOUNTER
Prescriptions were prescribed and sent to [facility] pharmacy today. Patient is at pharmacy and they are telling him they have not received.  Please resend to pharmacy

## 2023-02-17 ENCOUNTER — OFFICE VISIT (OUTPATIENT)
Dept: ORTHOPEDIC SURGERY | Age: 63
End: 2023-02-17

## 2023-02-17 VITALS — WEIGHT: 174 LBS | BODY MASS INDEX: 27.31 KG/M2 | HEIGHT: 67 IN

## 2023-02-17 DIAGNOSIS — M75.82 TENDINITIS OF LEFT ROTATOR CUFF: Primary | ICD-10-CM

## 2023-02-17 DIAGNOSIS — M25.512 ACUTE PAIN OF LEFT SHOULDER: ICD-10-CM

## 2023-02-17 DIAGNOSIS — M75.22 BICEPS TENDINITIS OF LEFT UPPER EXTREMITY: ICD-10-CM

## 2023-02-17 NOTE — PROGRESS NOTES
ORTHOPAEDIC SURGERY H&P / CONSULTATION NOTE    Chief complaint:   Chief Complaint   Patient presents with    Shoulder Pain     NP LEFT SHOULDER      History of present illness: The patient is a 59 y.o. male right hand dominant with subjective symptoms of left shoulder pain. The chief complaint is located at left shoulder posterior lateral aspect. Duration of symptoms has been for years. The severity of symptoms is rated at 6/10 pain on intake form. Patient denies trauma. 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 He states dull throbbing achy pain. He states discomfort occasionally with pushing and will start turning the steering wheel. It feels more posterior lateral than deep in the shoulder. He saw his primary care physician this week and was prescribed prednisone taper Dosepak as well as a antispasmodic. He is unable to take oral anti-inflammatories as he is on Plavix for cardiac stent. He has not done any therapy. He has not done any active otherwise medication although he does admit to occasionally taking ibuprofen. He denies instability. He denies recent fall. He denies it waking him up at night but does have some occasional discomfort if attempting to sleep on the shoulder. The patient has tried the below listed items prior to today's consultation for above listed chief complaint.     +   Over-the-counter anti-inflammatories/prescription medication anti-inflammatory.      -   Physical therapy / guided home exercise program -     -   Previous corticosteroid injections    Past medical history:    Past Medical History:   Diagnosis Date    CAD (coronary artery disease)     Hyperlipidemia     Hypertension         Past surgical history:    Past Surgical History:   Procedure Laterality Date    DIAGNOSTIC CARDIAC CATH LAB PROCEDURE  07/24/2018    FOOT SURGERY      MANDIBLE FRACTURE SURGERY      PTCA  07/24/2018    SHOULDER ARTHROSCOPY      R shoulder and rotator cuff Allergies: Allergies   Allergen Reactions    Codeine Palpitations and Other (See Comments)     \"Passed out, ended up in ICU\"    Coreg [Carvedilol]      Fatigue     Imdur [Isosorbide Nitrate] Other (See Comments)     headache         Medications:   Current Outpatient Medications:     diclofenac sodium (VOLTAREN) 1 % GEL, Apply 4 g topically 4 times daily as needed for Pain, Disp: 100 g, Rfl: 3    predniSONE (DELTASONE) 20 MG tablet, Take 1 tablet by mouth 2 times daily for 5 days, Disp: 10 tablet, Rfl: 0    tiZANidine (ZANAFLEX) 4 MG tablet, Take 1 tablet by mouth nightly, Disp: 30 tablet, Rfl: 0    lisinopril (PRINIVIL;ZESTRIL) 10 MG tablet, Take 1 tablet by mouth daily, Disp: 90 tablet, Rfl: 0    amLODIPine (NORVASC) 5 MG tablet, Take 1 tablet by mouth daily, Disp: 90 tablet, Rfl: 0    clopidogrel (PLAVIX) 75 MG tablet, Take 1 tablet by mouth daily, Disp: 90 tablet, Rfl: 0    atorvastatin (LIPITOR) 80 MG tablet, Take 1 tablet by mouth daily, Disp: 90 tablet, Rfl: 0    nitroGLYCERIN (NITROSTAT) 0.4 MG SL tablet, Place 1 tablet under the tongue every 5 minutes as needed for Chest pain up to max of 3 total doses. If no relief after 1 dose, call 911., Disp: 25 tablet, Rfl: 5    pantoprazole (PROTONIX) 40 MG tablet, Take 40 mg by mouth daily, Disp: , Rfl:      Social history: Denies IV drug use. Social History     Socioeconomic History    Marital status:       Spouse name: Not on file    Number of children: Not on file    Years of education: Not on file    Highest education level: Not on file   Occupational History    Not on file   Tobacco Use    Smoking status: Former     Packs/day: 1.00     Years: 43.00     Pack years: 43.00     Types: Cigarettes     Quit date: 2018     Years since quittin.5    Smokeless tobacco: Never   Vaping Use    Vaping Use: Never used   Substance and Sexual Activity    Alcohol use: Yes     Comment: 4-5 beers daily    Drug use: Yes     Frequency: 3.0 times per week Types: Marijuana Yonicarmen Vang)    Sexual activity: Yes     Partners: Female   Other Topics Concern    Not on file   Social History Narrative    Not on file     Social Determinants of Health     Financial Resource Strain: Low Risk     Difficulty of Paying Living Expenses: Not hard at all   Food Insecurity: No Food Insecurity    Worried About [address] in the Last Year: Never true    [address] in the Last Year: Never true   Transportation Needs: Unknown    Lack of Transportation (Medical): Not on file    Lack of Transportation (Non-Medical): No   Physical Activity: Not on file   Stress: Not on file   Social Connections: Not on file   Intimate Partner Violence: Not on file   Housing Stability: Unknown    Unable to Pay for Housing in the Last Year: Not on file    Number of Places Lived in the Last Year: Not on file    Unstable Housing in the Last Year: No     Tobacco use. Social History     Tobacco Use   Smoking Status Former    Packs/day: 1.00    Years: 43.00    Pack years: 43.00    Types: Cigarettes    Quit date: 2018    Years since quittin.5   Smokeless Tobacco Never     Employment: Noncontributory    Workers compensation claim: Noncontributory    Review of systems: Patient denies any fevers chills chest pain shortness of breath nausea vomiting significant weight loss any change in voiding or bowel movements. Patient denies any significant numbness or tingling at baseline as it relates to this presenting symptom/chief complaint. The patient denies any significant problems with skin or any significant allergies. Physical examination:  Body mass index is 27.25 kg/m².   AAOx3, NCAT  EOMI  MMM  RR  Unlabored breathing, no wheezing  Skin intact BUE and BLE, warm and moist  Bilateral upper extremity examination specific to subjective symptoms  Exam Left Shoulder  Active Range of Motion (FF/Abd/ER/IR)        170/170/40/sacrum             Passive Range of Motion (FF/Abd/ER/IR)     same  Trace Neer,    positive Santana,      5/5 albeit with discomfort   empty Can,          5/5 albeit with discomfort ER arm at the side,       5/5   belly Press,      5/5 bear Hug,       equivocal O'Briens,      positive   TTP at Biceps Tendon Sheath,     positive Speed, positive Yergeson, none   TTP AC Joint, negative cross arm adduction,                Skin intact throughout  5/5 D B T G IO EPL  SILT Ax, R, U, M  +2 radial pulse    Diagnostic imaging:  MY READ:  4 view left shoulder 2/17/2023: Negative fracture. Positive acromioclavicular joint arthrosis moderate. Early to mild glenohumeral arthrosis with slight joint space narrowing. Aligned glenohumeral joint. Pertinent lab work: None     Diagnosis Orders   1. Tendinitis of left rotator cuff  diclofenac sodium (VOLTAREN) 1 % GEL      2. Biceps tendinitis of left upper extremity  diclofenac sodium (VOLTAREN) 1 % GEL      3. Acute pain of left shoulder  XR SHOULDER LEFT (MIN 2 VIEWS)    diclofenac sodium (VOLTAREN) 1 % GEL          Assessment and plan: 59 y.o. male with current subjective symptoms and physical exam findings with diagnostic imaging correlating to left shoulder rotator cuff tendinitis/bicipital tenosynovitis. -Time of 23 minutes was spent coordinating and discussing the clinical findings, reviewing diagnostic imaging as indicated, coordinating care with prior notes review and current clinical encounter documentation as it pertains to the patient's presenting subjective symptoms and diagnoses. -I reviewed with the patient the imaging findings as well as clinical exam and  how it correlates to subjective symptoms.  -I had a pleasant discussion with the patient today. I reviewed with him that currently his clinical examination correlates the above listed. I reviewed with him conservative care treatment options at this time.   He has good strength on his rotator cuff testing albeit with pain in association with tendinitis/bursitis and bicipital tenosynovitis  -He is unable to take oral anti-inflammatories and is currently on a prednisone dose taper pack. He may continue this and ultimately he may also apply topical Voltaren gel to the left shoulder as instructed. Prescription was placed for this as well as recommended OTC and whichever is cheaper he may .  -Physician directed physical therapy was printed out given to the patient. Theravance were also included. -Pending the results of how he does with this conservative care in 2 to 4 weeks once he is completed the prednisone dose taper pack fraga possible consideration for corticosteroid injection as diagnostic and therapeutic into the subacromial space.  -Pending the results of that consideration then for an MRI for further evaluation and treatment options to be discussed pending symptoms and imaging results correlating to examination  -All questions answered to the patient's satisfaction and the patient expressed understanding and agreement with the above listed treatment plan  -Follow up in 2 to 4 weeks per the above  -Thank you for the clinical consultation and allowing me to participate in the patient's care. Electronically signed by Silva Meelndrez MD on 2/17/23 at 9:33 AM MARQUES Melendrez MD       Orthopaedic Surgery-Sports Medicine        Disclaimer: This note was dictated with voice recognition software. Though review and correction are routinely performed, please contact the office/medical records for any errors requiring correction.

## 2023-03-03 ENCOUNTER — SCHEDULED TELEPHONE ENCOUNTER (OUTPATIENT)
Dept: FAMILY MEDICINE CLINIC | Age: 63
End: 2023-03-03
Payer: COMMERCIAL

## 2023-03-03 DIAGNOSIS — J34.89 RHINORRHEA: ICD-10-CM

## 2023-03-03 DIAGNOSIS — R05.1 ACUTE COUGH: Primary | ICD-10-CM

## 2023-03-03 DIAGNOSIS — R09.81 SINUS CONGESTION: ICD-10-CM

## 2023-03-03 DIAGNOSIS — R68.83 CHILLS: ICD-10-CM

## 2023-03-03 PROCEDURE — 99213 OFFICE O/P EST LOW 20 MIN: CPT

## 2023-03-03 RX ORDER — DEXTROMETHORPHAN HYDROBROMIDE AND PROMETHAZINE HYDROCHLORIDE 15; 6.25 MG/5ML; MG/5ML
5 SYRUP ORAL NIGHTLY PRN
Qty: 120 ML | Refills: 0 | Status: SHIPPED | OUTPATIENT
Start: 2023-03-03 | End: 2023-03-10

## 2023-03-03 ASSESSMENT — ENCOUNTER SYMPTOMS
EYES NEGATIVE: 1
STRIDOR: 0
RHINORRHEA: 1
ALLERGIC/IMMUNOLOGIC NEGATIVE: 1
COUGH: 1
GASTROINTESTINAL NEGATIVE: 1
SINUS PRESSURE: 1
APNEA: 0
SHORTNESS OF BREATH: 0

## 2023-03-03 NOTE — PROGRESS NOTES
Arlene David is a 59 y.o. male evaluated via telephone on 3/3/2023 for Cough (Sx started 4 days ago ), Generalized Body Aches, and Congestion  . Assessment & Plan   1. Acute cough  -TheraFlu during the daytime as needed  -Phenergan DM only at nighttime as needed  -Likely secondary to postnasal drip from upper respiratory infection  -He does feel as if he is on the upswing and feeling better  -If not improved by Monday we will consider antibiotic  -     promethazine-dextromethorphan (PROMETHAZINE-DM) 6.25-15 MG/5ML syrup; Take 5 mLs by mouth nightly as needed for Cough, Disp-120 mL, R-0Normal  2. Sinus congestion  -See #1  3. Rhinorrhea  -See #1  4. Chills  -See #1  Return if symptoms worsen or fail to improve. Subjective       Patient seen virtually today for chief complaint of acute cough, sinus congestion, postnasal drip and rhinorrhea that has been ongoing for the past 5 days. He also reports that he did have generalized body aches and fever. He did take a COVID test at home today and it was negative. Today he reports that he does feel improvement. He does have coughing spells about every 1/2 hour or so. He does also report some sneezing. He reports that coughing is mainly worse at nighttime secondary to postnasal drip. He did buy some TheraFlu that he is getting try over-the-counter at home today. Denies any fever. Prior to Visit Medications    Medication Sig Taking?  Authorizing Provider   promethazine-dextromethorphan (PROMETHAZINE-DM) 6.25-15 MG/5ML syrup Take 5 mLs by mouth nightly as needed for Cough Yes SHAN Pendleton - [facility]   diclofenac sodium (VOLTAREN) 1 % GEL Apply 4 g topically 4 times daily as needed for Pain Yes Janelle Estrada MD   tiZANidine (ZANAFLEX) 4 MG tablet Take 1 tablet by mouth nightly Yes SHAN Pendleton CNP   lisinopril (PRINIVIL;ZESTRIL) 10 MG tablet Take 1 tablet by mouth daily Yes Radha Skaggs MD   amLODIPine (NORVASC) 5 MG tablet Take 1 tablet by mouth daily Yes Shan Cee MD   clopidogrel (PLAVIX) 75 MG tablet Take 1 tablet by mouth daily Yes Shan Cee MD   atorvastatin (LIPITOR) 80 MG tablet Take 1 tablet by mouth daily Yes Shan Cee MD   nitroGLYCERIN (NITROSTAT) 0.4 MG SL tablet Place 1 tablet under the tongue every 5 minutes as needed for Chest pain up to max of 3 total doses. If no relief after 1 dose, call 911. Yes Shan Cee MD   pantoprazole (PROTONIX) 40 MG tablet Take 40 mg by mouth daily Yes Historical Provider, MD         Patient seen VV for persistent cough, body aches, chills, headache,     Temp normal.     Covid test negative. Review of Systems   Constitutional:  Positive for chills and fatigue. HENT:  Positive for congestion, postnasal drip, rhinorrhea, sinus pressure and sneezing. Negative for ear discharge and ear pain. Eyes: Negative. Respiratory:  Positive for cough. Negative for apnea, shortness of breath and stridor. Cardiovascular: Negative. Gastrointestinal: Negative. Endocrine: Negative. Genitourinary: Negative. Musculoskeletal: Negative. Allergic/Immunologic: Negative. Neurological: Negative. Hematological: Negative. Psychiatric/Behavioral: Negative. Objective   Patient-Reported Vitals  No data recorded       Total Time: minutes: 11-20 minutes     [facility] was evaluated through a synchronous (real-time) audio only encounter. Patient identification was verified at the start of the visit. He (or guardian if applicable) is aware that this is a billable service, which includes applicable co-pays. This visit was conducted with patient's (and/or legal guardian's) verbal consent. He has not had a related appointment within my department in the past 7 days or scheduled within the next 24 hours. The patient was located at Home: [address] [place] [address] [address] [address].   The provider was located at [facility] ([facility] [address] [facility],  [address].      Antonia Nelson, APRN - CNP

## 2023-03-15 DIAGNOSIS — I25.10 CORONARY ARTERY DISEASE INVOLVING NATIVE CORONARY ARTERY OF NATIVE HEART WITHOUT ANGINA PECTORIS: ICD-10-CM

## 2023-03-15 DIAGNOSIS — E78.2 MIXED HYPERLIPIDEMIA: Primary | ICD-10-CM

## 2023-03-15 DIAGNOSIS — I10 ESSENTIAL HYPERTENSION: ICD-10-CM

## 2023-03-15 RX ORDER — CLOPIDOGREL BISULFATE 75 MG/1
75 TABLET ORAL DAILY
Qty: 90 TABLET | Refills: 0 | Status: SHIPPED | OUTPATIENT
Start: 2023-03-15

## 2023-03-15 RX ORDER — AMLODIPINE BESYLATE 5 MG/1
5 TABLET ORAL DAILY
Qty: 90 TABLET | Refills: 0 | Status: SHIPPED | OUTPATIENT
Start: 2023-03-15

## 2023-03-15 RX ORDER — LISINOPRIL 10 MG/1
10 TABLET ORAL DAILY
Qty: 90 TABLET | Refills: 0 | Status: SHIPPED | OUTPATIENT
Start: 2023-03-15

## 2023-03-15 RX ORDER — ATORVASTATIN CALCIUM 80 MG/1
80 TABLET, FILM COATED ORAL DAILY
Qty: 90 TABLET | Refills: 0 | Status: SHIPPED | OUTPATIENT
Start: 2023-03-15

## 2023-04-24 ENCOUNTER — NURSE ONLY (OUTPATIENT)
Dept: FAMILY MEDICINE CLINIC | Age: 63
End: 2023-04-24

## 2023-04-24 ENCOUNTER — OFFICE VISIT (OUTPATIENT)
Dept: CARDIOLOGY CLINIC | Age: 63
End: 2023-04-24
Payer: COMMERCIAL

## 2023-04-24 VITALS
HEIGHT: 67 IN | TEMPERATURE: 98.6 F | SYSTOLIC BLOOD PRESSURE: 134 MMHG | WEIGHT: 172.8 LBS | OXYGEN SATURATION: 95 % | HEART RATE: 66 BPM | DIASTOLIC BLOOD PRESSURE: 76 MMHG | BODY MASS INDEX: 27.12 KG/M2

## 2023-04-24 DIAGNOSIS — I10 ESSENTIAL HYPERTENSION: ICD-10-CM

## 2023-04-24 DIAGNOSIS — I25.10 CORONARY ARTERY DISEASE INVOLVING NATIVE CORONARY ARTERY OF NATIVE HEART WITHOUT ANGINA PECTORIS: ICD-10-CM

## 2023-04-24 DIAGNOSIS — E78.2 MIXED HYPERLIPIDEMIA: ICD-10-CM

## 2023-04-24 DIAGNOSIS — R07.9 CHEST PAIN, UNSPECIFIED TYPE: ICD-10-CM

## 2023-04-24 DIAGNOSIS — Z79.899 MEDICATION MANAGEMENT: ICD-10-CM

## 2023-04-24 DIAGNOSIS — Z87.891 HISTORY OF TOBACCO ABUSE: ICD-10-CM

## 2023-04-24 DIAGNOSIS — Z79.899 MEDICATION MANAGEMENT: Primary | ICD-10-CM

## 2023-04-24 LAB
ALBUMIN SERPL-MCNC: 4.8 G/DL (ref 3.4–5)
ALBUMIN/GLOB SERPL: 1.7 {RATIO} (ref 1.1–2.2)
ALP SERPL-CCNC: 89 U/L (ref 40–129)
ALT SERPL-CCNC: 34 U/L (ref 10–40)
ANION GAP SERPL CALCULATED.3IONS-SCNC: 10 MMOL/L (ref 3–16)
AST SERPL-CCNC: 27 U/L (ref 15–37)
BILIRUB SERPL-MCNC: 0.4 MG/DL (ref 0–1)
BUN SERPL-MCNC: 18 MG/DL (ref 7–20)
CALCIUM SERPL-MCNC: 9.9 MG/DL (ref 8.3–10.6)
CHLORIDE SERPL-SCNC: 103 MMOL/L (ref 99–110)
CHOLEST SERPL-MCNC: 209 MG/DL (ref 0–199)
CO2 SERPL-SCNC: 29 MMOL/L (ref 21–32)
CREAT SERPL-MCNC: 1 MG/DL (ref 0.8–1.3)
DEPRECATED RDW RBC AUTO: 13.8 % (ref 12.4–15.4)
GFR SERPLBLD CREATININE-BSD FMLA CKD-EPI: >60 ML/MIN/{1.73_M2}
GLUCOSE SERPL-MCNC: 106 MG/DL (ref 70–99)
HCT VFR BLD AUTO: 42.9 % (ref 40.5–52.5)
HDLC SERPL-MCNC: 68 MG/DL (ref 40–60)
HGB BLD-MCNC: 14.6 G/DL (ref 13.5–17.5)
LDLC SERPL CALC-MCNC: 114 MG/DL
MCH RBC QN AUTO: 29.6 PG (ref 26–34)
MCHC RBC AUTO-ENTMCNC: 33.9 G/DL (ref 31–36)
MCV RBC AUTO: 87.3 FL (ref 80–100)
PLATELET # BLD AUTO: 203 K/UL (ref 135–450)
PMV BLD AUTO: 9.3 FL (ref 5–10.5)
POTASSIUM SERPL-SCNC: 5 MMOL/L (ref 3.5–5.1)
PROT SERPL-MCNC: 7.6 G/DL (ref 6.4–8.2)
RBC # BLD AUTO: 4.91 M/UL (ref 4.2–5.9)
SODIUM SERPL-SCNC: 142 MMOL/L (ref 136–145)
TRIGL SERPL-MCNC: 133 MG/DL (ref 0–150)
TSH SERPL DL<=0.005 MIU/L-ACNC: 2.72 UIU/ML (ref 0.27–4.2)
VLDLC SERPL CALC-MCNC: 27 MG/DL
WBC # BLD AUTO: 5.6 K/UL (ref 4–11)

## 2023-04-24 PROCEDURE — 3078F DIAST BP <80 MM HG: CPT | Performed by: INTERNAL MEDICINE

## 2023-04-24 PROCEDURE — 99214 OFFICE O/P EST MOD 30 MIN: CPT | Performed by: INTERNAL MEDICINE

## 2023-04-24 PROCEDURE — 3075F SYST BP GE 130 - 139MM HG: CPT | Performed by: INTERNAL MEDICINE

## 2023-04-24 RX ORDER — LISINOPRIL 10 MG/1
10 TABLET ORAL DAILY
Qty: 90 TABLET | Refills: 3 | Status: SHIPPED | OUTPATIENT
Start: 2023-04-24

## 2023-04-24 RX ORDER — CLOPIDOGREL BISULFATE 75 MG/1
75 TABLET ORAL DAILY
Qty: 90 TABLET | Refills: 3 | Status: SHIPPED | OUTPATIENT
Start: 2023-04-24

## 2023-04-24 RX ORDER — NITROGLYCERIN 0.4 MG/1
0.4 TABLET SUBLINGUAL EVERY 5 MIN PRN
Qty: 25 TABLET | Refills: 5 | Status: SHIPPED | OUTPATIENT
Start: 2023-04-24

## 2023-04-24 RX ORDER — AMLODIPINE BESYLATE 5 MG/1
5 TABLET ORAL DAILY
Qty: 90 TABLET | Refills: 3 | Status: SHIPPED | OUTPATIENT
Start: 2023-04-24

## 2023-04-24 RX ORDER — ATORVASTATIN CALCIUM 80 MG/1
80 TABLET, FILM COATED ORAL DAILY
Qty: 90 TABLET | Refills: 3 | Status: SHIPPED | OUTPATIENT
Start: 2023-04-24

## 2023-04-24 NOTE — PATIENT INSTRUCTIONS
Plan:  1. Instructed to call office if you cannot afford medications we will provide samples of resources for you. 2. Order lab work fasting~ CMP, Lipid Panel, thyroid and CBC   3. I recommend that the patient continue their currently prescribed medications. Their drug modifiable risk factors appear to be well controlled. I will continue to address the need/dosing of medications in future visits. 4. Discussed prescribing Repatha ~ unaffordable with losing insurance. Continue low cholesterol diet and Lipitor 80 mg once daily. 5. Follow up in one year.

## 2023-04-24 NOTE — PROGRESS NOTES
[address]   Cardiac Follow Up    Referring Provider:  SHAN Bruner CNP     Chief Complaint   Patient presents with    Follow-up     Yearly office visit    Coronary Artery Disease    Other     No new concerns        Self-referred and established cardiac care with me 8/28/18   Subjective:  Patient being seen today for routine f/u CAD with hx LHC 7/24/18 s/p 1 stent, HTN, HLD; denies any complaints today. Past Medical History:    Mr. Ivet Puente is a Ar[age] Pilar old male who has PMH  HTN, HLD, CAD s/p mild NSTEMI and OM#2 stent 7/18, hx Covid PNA, and prior tobacco use (quit 2018 after NSTEMI). Mr Ivet Puente woke up with mid-sternal CP radiating into neck region. Drove to [facility] ER and took NST SL and aspirin. Most recent LHC by Dr. Marysol Barnard on 7/24/18 2V CAD with proximal RCA 50-60% stenosis and OM2 with mid-vessel 75% stenosis; proximal mid-LAD 25-30%; s/p HESHAM stent  mid-OM2. ECHO 7/24/18 EF=55-60%. Normal diastolic function. Note HA with imdur. History of Present Illness:        Went to ED on 11/10/21 with s/o SOB and P. Most recent EKG 11/10/21 NSR, HR 64 . Troponin was negative. Most recent Echo 12/03/21 EF= 55-60% with mild MR. Most recent GXT myoview 12/03/21 no clear evidence of ischemia or scar, hypertension with EKG changes. He had a routine CT lung screening 06/08/22 for history of tobacco abuse  that showed severe coronary calcifications and ascending aorta dilation 3.7 cm. Today he reports he is doing well. Patient currently denies any weight gain, edema, palpitations, chest pain, shortness of breath, dizziness, and syncope. He is taking medications as prescribed, tolerating well. He is compliant with Lipitor 80 mg q daily. He states he is losing his job in next 30 days and that includes his insurance. He will have harder time affording meds. Past Medical History:   has a past medical history of CAD (coronary artery disease), Hyperlipidemia, and Hypertension.     Surgical History:

## 2023-04-26 ENCOUNTER — TELEPHONE (OUTPATIENT)
Dept: CARDIOLOGY CLINIC | Age: 63
End: 2023-04-26

## 2023-07-17 ENCOUNTER — TELEPHONE (OUTPATIENT)
Dept: TELEMETRY | Age: 63
End: 2023-07-17

## 2023-07-17 NOTE — TELEPHONE ENCOUNTER
Patient due for annual CT Lung Screening. Reminder letter mailed. Order pended to chart.     La Hammond RN

## 2023-08-19 ENCOUNTER — TELEPHONE (OUTPATIENT)
Dept: TELEMETRY | Age: 63
End: 2023-08-19

## 2023-08-19 NOTE — TELEPHONE ENCOUNTER
Patient due for annual CT Lung Screening. Reminder letter mailed. Order already pending in chart.     Gary Primrose, RN

## 2024-02-13 ENCOUNTER — HOSPITAL ENCOUNTER (EMERGENCY)
Age: 64
Discharge: LEFT BEFORE DISCHARGE | End: 2024-02-14
Attending: STUDENT IN AN ORGANIZED HEALTH CARE EDUCATION/TRAINING PROGRAM

## 2024-02-13 ENCOUNTER — APPOINTMENT (OUTPATIENT)
Dept: GENERAL RADIOLOGY | Age: 64
End: 2024-02-13

## 2024-02-13 DIAGNOSIS — J44.1 COPD EXACERBATION (HCC): Primary | ICD-10-CM

## 2024-02-13 LAB
ALBUMIN SERPL-MCNC: 4.6 G/DL (ref 3.4–5)
ALBUMIN/GLOB SERPL: 1.3 {RATIO} (ref 1.1–2.2)
ALP SERPL-CCNC: 82 U/L (ref 40–129)
ALT SERPL-CCNC: 32 U/L (ref 10–40)
ANION GAP SERPL CALCULATED.3IONS-SCNC: 12 MMOL/L (ref 3–16)
AST SERPL-CCNC: 22 U/L (ref 15–37)
BASE EXCESS BLDV CALC-SCNC: 0.8 MMOL/L (ref -3–3)
BILIRUB SERPL-MCNC: 0.3 MG/DL (ref 0–1)
BUN SERPL-MCNC: 14 MG/DL (ref 7–20)
CALCIUM SERPL-MCNC: 10 MG/DL (ref 8.3–10.6)
CHLORIDE SERPL-SCNC: 101 MMOL/L (ref 99–110)
CO2 BLDV-SCNC: 30 MMOL/L
CO2 SERPL-SCNC: 27 MMOL/L (ref 21–32)
COHGB MFR BLDV: 1 % (ref 0–1.5)
CREAT SERPL-MCNC: 1 MG/DL (ref 0.8–1.3)
FLUAV RNA UPPER RESP QL NAA+PROBE: NEGATIVE
FLUBV AG NPH QL: NEGATIVE
GFR SERPLBLD CREATININE-BSD FMLA CKD-EPI: >60 ML/MIN/{1.73_M2}
GLUCOSE SERPL-MCNC: 94 MG/DL (ref 70–99)
HCO3 BLDV-SCNC: 28.5 MMOL/L (ref 23–29)
METHGB MFR BLDV: 0.3 %
NT-PROBNP SERPL-MCNC: 114 PG/ML (ref 0–124)
O2 THERAPY: ABNORMAL
PCO2 BLDV: 58 MMHG (ref 40–50)
PH BLDV: 7.31 [PH] (ref 7.35–7.45)
PO2 BLDV: 31.2 MMHG (ref 25–40)
POTASSIUM SERPL-SCNC: 3.7 MMOL/L (ref 3.5–5.1)
PROT SERPL-MCNC: 8.2 G/DL (ref 6.4–8.2)
SAO2 % BLDV: 53 %
SARS-COV-2 RDRP RESP QL NAA+PROBE: NOT DETECTED
SODIUM SERPL-SCNC: 140 MMOL/L (ref 136–145)

## 2024-02-13 PROCEDURE — 6370000000 HC RX 637 (ALT 250 FOR IP): Performed by: STUDENT IN AN ORGANIZED HEALTH CARE EDUCATION/TRAINING PROGRAM

## 2024-02-13 PROCEDURE — 36415 COLL VENOUS BLD VENIPUNCTURE: CPT

## 2024-02-13 PROCEDURE — 71045 X-RAY EXAM CHEST 1 VIEW: CPT

## 2024-02-13 PROCEDURE — 93005 ELECTROCARDIOGRAM TRACING: CPT | Performed by: STUDENT IN AN ORGANIZED HEALTH CARE EDUCATION/TRAINING PROGRAM

## 2024-02-13 PROCEDURE — 80053 COMPREHEN METABOLIC PANEL: CPT

## 2024-02-13 PROCEDURE — 87804 INFLUENZA ASSAY W/OPTIC: CPT

## 2024-02-13 PROCEDURE — 85025 COMPLETE CBC W/AUTO DIFF WBC: CPT

## 2024-02-13 PROCEDURE — 96374 THER/PROPH/DIAG INJ IV PUSH: CPT

## 2024-02-13 PROCEDURE — 87635 SARS-COV-2 COVID-19 AMP PRB: CPT

## 2024-02-13 PROCEDURE — 84484 ASSAY OF TROPONIN QUANT: CPT

## 2024-02-13 PROCEDURE — 83880 ASSAY OF NATRIURETIC PEPTIDE: CPT

## 2024-02-13 PROCEDURE — 83605 ASSAY OF LACTIC ACID: CPT

## 2024-02-13 PROCEDURE — 6360000002 HC RX W HCPCS: Performed by: STUDENT IN AN ORGANIZED HEALTH CARE EDUCATION/TRAINING PROGRAM

## 2024-02-13 PROCEDURE — 85379 FIBRIN DEGRADATION QUANT: CPT

## 2024-02-13 PROCEDURE — 2580000003 HC RX 258: Performed by: STUDENT IN AN ORGANIZED HEALTH CARE EDUCATION/TRAINING PROGRAM

## 2024-02-13 PROCEDURE — 82803 BLOOD GASES ANY COMBINATION: CPT

## 2024-02-13 PROCEDURE — 87807 RSV ASSAY W/OPTIC: CPT

## 2024-02-13 PROCEDURE — 99285 EMERGENCY DEPT VISIT HI MDM: CPT

## 2024-02-13 RX ORDER — IPRATROPIUM BROMIDE AND ALBUTEROL SULFATE 2.5; .5 MG/3ML; MG/3ML
1 SOLUTION RESPIRATORY (INHALATION) ONCE
Status: COMPLETED | OUTPATIENT
Start: 2024-02-13 | End: 2024-02-13

## 2024-02-13 RX ADMIN — IPRATROPIUM BROMIDE AND ALBUTEROL SULFATE 1 DOSE: 2.5; .5 SOLUTION RESPIRATORY (INHALATION) at 23:28

## 2024-02-13 RX ADMIN — METHYLPREDNISOLONE SODIUM SUCCINATE 125 MG: 125 INJECTION INTRAMUSCULAR; INTRAVENOUS at 23:40

## 2024-02-14 VITALS
SYSTOLIC BLOOD PRESSURE: 168 MMHG | DIASTOLIC BLOOD PRESSURE: 84 MMHG | HEART RATE: 76 BPM | BODY MASS INDEX: 24.19 KG/M2 | TEMPERATURE: 98.7 F | WEIGHT: 154.1 LBS | HEIGHT: 67 IN | RESPIRATION RATE: 20 BRPM | OXYGEN SATURATION: 94 %

## 2024-02-14 LAB
BASOPHILS # BLD: 0 K/UL (ref 0–0.2)
BASOPHILS NFR BLD: 0.4 %
D DIMER: <0.27 UG/ML FEU (ref 0–0.6)
DEPRECATED RDW RBC AUTO: 13.7 % (ref 12.4–15.4)
EKG ATRIAL RATE: 82 BPM
EKG DIAGNOSIS: NORMAL
EKG P AXIS: 77 DEGREES
EKG P-R INTERVAL: 158 MS
EKG Q-T INTERVAL: 382 MS
EKG QRS DURATION: 90 MS
EKG QTC CALCULATION (BAZETT): 446 MS
EKG R AXIS: 78 DEGREES
EKG T AXIS: 67 DEGREES
EKG VENTRICULAR RATE: 82 BPM
EOSINOPHIL # BLD: 0.2 K/UL (ref 0–0.6)
EOSINOPHIL NFR BLD: 2.1 %
HCT VFR BLD AUTO: 41.8 % (ref 40.5–52.5)
HGB BLD-MCNC: 14.4 G/DL (ref 13.5–17.5)
LACTATE BLDV-SCNC: 1.1 MMOL/L (ref 0.4–1.9)
LYMPHOCYTES # BLD: 1.8 K/UL (ref 1–5.1)
LYMPHOCYTES NFR BLD: 22.1 %
MCH RBC QN AUTO: 29.1 PG (ref 26–34)
MCHC RBC AUTO-ENTMCNC: 34.5 G/DL (ref 31–36)
MCV RBC AUTO: 84.5 FL (ref 80–100)
MONOCYTES # BLD: 0.4 K/UL (ref 0–1.3)
MONOCYTES NFR BLD: 5.1 %
NEUTROPHILS # BLD: 5.6 K/UL (ref 1.7–7.7)
NEUTROPHILS NFR BLD: 70.3 %
PLATELET # BLD AUTO: 262 K/UL (ref 135–450)
PMV BLD AUTO: 8 FL (ref 5–10.5)
RBC # BLD AUTO: 4.94 M/UL (ref 4.2–5.9)
RSV AG NOSE QL: NEGATIVE
TROPONIN, HIGH SENSITIVITY: 7 NG/L (ref 0–22)
TROPONIN, HIGH SENSITIVITY: <6 NG/L (ref 0–22)
WBC # BLD AUTO: 7.8 K/UL (ref 4–11)

## 2024-02-14 PROCEDURE — 6370000000 HC RX 637 (ALT 250 FOR IP): Performed by: STUDENT IN AN ORGANIZED HEALTH CARE EDUCATION/TRAINING PROGRAM

## 2024-02-14 PROCEDURE — 36415 COLL VENOUS BLD VENIPUNCTURE: CPT

## 2024-02-14 PROCEDURE — 93010 ELECTROCARDIOGRAM REPORT: CPT | Performed by: STUDENT IN AN ORGANIZED HEALTH CARE EDUCATION/TRAINING PROGRAM

## 2024-02-14 PROCEDURE — 84484 ASSAY OF TROPONIN QUANT: CPT

## 2024-02-14 RX ORDER — PREDNISONE 50 MG/1
50 TABLET ORAL DAILY
Qty: 5 TABLET | Refills: 0 | Status: SHIPPED | OUTPATIENT
Start: 2024-02-15 | End: 2024-02-20

## 2024-02-14 RX ORDER — IPRATROPIUM BROMIDE AND ALBUTEROL SULFATE 2.5; .5 MG/3ML; MG/3ML
1 SOLUTION RESPIRATORY (INHALATION) ONCE
Status: COMPLETED | OUTPATIENT
Start: 2024-02-14 | End: 2024-02-14

## 2024-02-14 RX ORDER — ALBUTEROL SULFATE 90 UG/1
2 AEROSOL, METERED RESPIRATORY (INHALATION) 4 TIMES DAILY PRN
Qty: 54 G | Refills: 0 | Status: SHIPPED | OUTPATIENT
Start: 2024-02-14 | End: 2024-05-25

## 2024-02-14 RX ORDER — DOXYCYCLINE HYCLATE 100 MG
100 TABLET ORAL 2 TIMES DAILY
Qty: 10 TABLET | Refills: 0 | Status: SHIPPED | OUTPATIENT
Start: 2024-02-14 | End: 2024-02-19

## 2024-02-14 RX ADMIN — IPRATROPIUM BROMIDE AND ALBUTEROL SULFATE 1 DOSE: 2.5; .5 SOLUTION RESPIRATORY (INHALATION) at 00:39

## 2024-02-14 NOTE — ED NOTES
Pt does not want to be admitted at this time.  Stated they felt much better and would like to just go home.  Pt signed AMA form.  Pt is alert and oriented X 4.

## 2024-02-14 NOTE — ED PROVIDER NOTES
Dose (1 Dose Inhalation Given 2/13/24 2328)   methylPREDNISolone sodium succ (SOLU-MEDROL) 125 mg in sterile water 2 mL injection (125 mg IntraVENous Given 2/13/24 2340)   ipratropium 0.5 mg-albuterol 2.5 mg (DUONEB) nebulizer solution 1 Dose (1 Dose Inhalation Given 2/14/24 0039)        CONSULTS:   None   Discussion with Other Professionals: None   {Social Determinants: None   Chronic Conditions:  None    Records Reviewed: NA      Disposition Considerations: Patient signed out AGAINST MEDICAL ADVICE  I am the Primary Clinician of Record.        FINAL IMPRESSION    1. COPD exacerbation (HCC)           DISPOSITION/PLAN:   Signed out AMA    PATIENT REFERRED TO:   [facility]  [address]  [address]  [facility] [id number] [phone]  Schedule an appointment as soon as possible for a visit   For pulmonology follow up    [facility] Emergency Department  [address] [place] [id number] [phone]  Go to   If symptoms worsen       DISCHARGE MEDICATIONS:   New Prescriptions    ALBUTEROL SULFATE HFA (VENTOLIN HFA) 108 (90 BASE) MCG/ACT INHALER    Inhale 2 puffs into the lungs 4 times daily as needed for Shortness of Breath    DOXYCYCLINE HYCLATE (VIBRA-TABS) 100 MG TABLET    Take 1 tablet by mouth 2 times daily for 5 days    PREDNISONE (DELTASONE) 50 MG TABLET    Take 1 tablet by mouth daily for 5 days        DISCONTINUED MEDICATIONS:   Discontinued Medications    No medications on file              (Please note that portions of this note were completed with a voice recognition program.  Efforts were made to edit the dictations but occasionally words are mis-transcribed.)       Sophia Hays MD (electronically signed)             Sophia Hays MD  02/14/24 0108

## 2024-02-14 NOTE — ED NOTES
Girlfriend brought to bedside at this time.  Family member asked for warm blanket upon entering room.

## 2024-07-11 ENCOUNTER — APPOINTMENT (OUTPATIENT)
Dept: GENERAL RADIOLOGY | Age: 64
End: 2024-07-11
Attending: EMERGENCY MEDICINE
Payer: COMMERCIAL

## 2024-07-11 ENCOUNTER — HOSPITAL ENCOUNTER (EMERGENCY)
Age: 64
Discharge: HOME | End: 2024-07-11
Attending: EMERGENCY MEDICINE
Payer: COMMERCIAL

## 2024-07-11 VITALS
RESPIRATION RATE: 18 BRPM | TEMPERATURE: 98.6 F | WEIGHT: 160 LBS | DIASTOLIC BLOOD PRESSURE: 78 MMHG | HEART RATE: 78 BPM | BODY MASS INDEX: 25.11 KG/M2 | SYSTOLIC BLOOD PRESSURE: 124 MMHG | HEIGHT: 67 IN | OXYGEN SATURATION: 97 %

## 2024-07-11 DIAGNOSIS — S61.012A LACERATION OF LEFT THUMB WITHOUT DAMAGE TO NAIL, FOREIGN BODY PRESENCE UNSPECIFIED, INITIAL ENCOUNTER: Primary | ICD-10-CM

## 2024-07-11 PROCEDURE — 12002 RPR S/N/AX/GEN/TRNK2.6-7.5CM: CPT

## 2024-07-11 PROCEDURE — 90471 IMMUNIZATION ADMIN: CPT | Performed by: EMERGENCY MEDICINE

## 2024-07-11 PROCEDURE — 6360000002 HC RX W HCPCS: Performed by: EMERGENCY MEDICINE

## 2024-07-11 PROCEDURE — 90715 TDAP VACCINE 7 YRS/> IM: CPT | Performed by: EMERGENCY MEDICINE

## 2024-07-11 PROCEDURE — 99284 EMERGENCY DEPT VISIT MOD MDM: CPT

## 2024-07-11 PROCEDURE — 73140 X-RAY EXAM OF FINGER(S): CPT

## 2024-07-11 RX ORDER — CEPHALEXIN 500 MG/1
500 CAPSULE ORAL 4 TIMES DAILY
Qty: 40 CAPSULE | Refills: 0 | Status: SHIPPED | OUTPATIENT
Start: 2024-07-11 | End: 2024-07-21

## 2024-07-11 RX ADMIN — TETANUS TOXOID, REDUCED DIPHTHERIA TOXOID AND ACELLULAR PERTUSSIS VACCINE, ADSORBED 0.5 ML: 5; 2.5; 8; 8; 2.5 SUSPENSION INTRAMUSCULAR at 17:38

## 2024-07-11 ASSESSMENT — LIFESTYLE VARIABLES
HOW OFTEN DO YOU HAVE A DRINK CONTAINING ALCOHOL: NEVER
HOW MANY STANDARD DRINKS CONTAINING ALCOHOL DO YOU HAVE ON A TYPICAL DAY: PATIENT DOES NOT DRINK

## 2024-07-11 ASSESSMENT — PAIN - FUNCTIONAL ASSESSMENT: PAIN_FUNCTIONAL_ASSESSMENT: NONE - DENIES PAIN

## 2024-07-11 NOTE — ED PROVIDER NOTES
[facility] EMERGENCY DEPARTMENT  EMERGENCY DEPARTMENT ENCOUNTER      Pt Name: Alfonso Newell  MRN: [id number]  Birthdate 1960  Date of evaluation: 7/11/2024  Provider: STEPHANIE TOPETE MD    CHIEF COMPLAINT       Chief Complaint   Patient presents with    Laceration         HISTORY OF PRESENT ILLNESS   (Location/Symptom, Timing/Onset, Context/Setting, Quality, Duration, Modifying Factors, Severity)  Note limiting factors.     Alfonso Newell is a 64 y.o. male who presents to the emergency department     Patient was using a table saw apparently was a new 1 and a kicked up and hit him in the left thumb he is right-handed dominant.  Patient is unsure of tetanus so we did update him.  Patient states that he thought it hip bone  He is not immunocompromised and not a diabetic.          Nursing Notes were reviewed.    REVIEW OF SYSTEMS    (2-9 systems for level 4, 10 or more for level 5)     Review of Systems   Constitutional:  Positive for activity change.   Skin:  Positive for wound.   Neurological:  Negative for light-headedness.   All other systems reviewed and are negative.      Except as noted above the remainder of the review of systems was reviewed and negative.       PAST MEDICAL HISTORY     Past Medical History:   Diagnosis Date    CAD (coronary artery disease)     Hyperlipidemia     Hypertension          SURGICAL HISTORY       Past Surgical History:   Procedure Laterality Date    CARDIAC SURGERY      stent x1    DIAGNOSTIC CARDIAC CATH LAB PROCEDURE  07/24/2018    FOOT SURGERY      MANDIBLE FRACTURE SURGERY      PTCA  07/24/2018    SHOULDER ARTHROSCOPY      R shoulder and rotator cuff         CURRENT MEDICATIONS       Discharge Medication List as of 7/11/2024  6:54 PM        CONTINUE these medications which have NOT CHANGED    Details   albuterol sulfate HFA (VENTOLIN HFA) 108 (90 Base) MCG/ACT inhaler Inhale 2 puffs into the lungs 4 times daily as needed for Shortness of Breath, Disp-54 g, R-0Normal     epi    Block injection procedure:  Anatomic landmarks identified, introduced needle and anatomic landmarks palpated  Laceration details:     Location:  Finger    Finger location:  L thumb    Length (cm):  7  Pre-procedure details:     Preparation:  Patient was prepped and draped in usual sterile fashion  Exploration:     Hemostasis achieved with:  Direct pressure    Imaging outcome: foreign body not noted      Wound exploration: wound explored through full range of motion      Wound extent: areolar tissue violated    Treatment:     Area cleansed with:  Chlorhexidine    Amount of cleaning:  Standard    Debridement:  Minimal  Skin repair:     Repair method:  Sutures    Suture size:  5-0    Suture material:  Prolene    Suture technique:  Simple interrupted    Number of sutures:  8  Approximation:     Approximation:  Close  Repair type:     Repair type:  Simple  Post-procedure details:     Dressing:  Antibiotic ointment, non-adherent dressing and sterile dressing    Procedure completion:  Tolerated      MEDICATIONS GIVEN THIS VISIT:  Medications   Tetanus-Diphth-Acell Pertussis (BOOSTRIX) injection 0.5 mL (0.5 mLs IntraMUSCular Given 7/11/24 7335)        FINAL IMPRESSION      1. Laceration of left thumb without damage to nail, foreign body presence unspecified, initial encounter            DISPOSITION/PLAN   DISPOSITION Decision To Discharge 07/11/2024 06:50:27 PM      PATIENT REFERRED TO:  Stevan Taylor MD  [address] [facility] [facility] [id number] [phone]    Schedule an appointment as soon as possible for a visit in 14 days  For suture removal, For wound re-check      DISCHARGE MEDICATIONS:  Discharge Medication List as of 7/11/2024  6:54 PM        START taking these medications    Details   cephALEXin (KEFLEX) 500 MG capsule Take 1 capsule by mouth 4 times daily for 10 days, Disp-40 capsule, R-0Print             Controlled Substances Monitoring       No data to display                    (Please note that

## 2024-07-11 NOTE — DISCHARGE INSTRUCTIONS
Tylenol 650 mg every 4 hours  Ibuprofen 600 mg 3 times a day for pain  Keep the hand elevated is much as possible  Clean it once or twice a day starting Saturday morning, do not scrub at just pat it clean  Apply Polysporin  Double bandage with a vertical and horizontal Band-Aid  Follow-up with Dr. Taylor orthopedic specialist in 14 days for suture removal and wound check  Be vigilant on looking for signs of infection

## 2024-09-28 RX ORDER — ATORVASTATIN CALCIUM 80 MG/1
80 TABLET, FILM COATED ORAL DAILY
Qty: 90 TABLET | Refills: 0 | OUTPATIENT
Start: 2024-09-28

## 2024-10-02 NOTE — LETTER
[facility] SURGERY  Surgery Scheduling Form:      DEMOGRAPHICS:                                                                                                              .    Patient Name:  Gisela Puente  Patient :  1960   Patient SS#:  [id number]    Patient Phone:  [phone] (home)  Alt. Patient Phone:    Patient Address:  [address] [address]    PCP:  SHAN Murcia CNP  Insurance:    Payer/Plan Subscr  Sex Relation Sub. Ins. ID Effective Group Num   1. [employer] - * GAB TATE 1960 Male Self [id number]                                   [address]   2. [address]  [facility] AND * 1960 Male Employee [id number]                                    [employer] ID Number:    DIAGNOSIS & PROCEDURE:                                                                                            .    Diagnosis:    right Carpal Tunnel Syndrome and right Index Finger and Small Finger Trigger Finger (M65.30)  Operation:   right Carpal Tunnel Release and Right Index Finger and Small Finger Trigger Finger Release   [CPT: U3797961 and [id number]]  Location:  [facility]  Surgeon:  Daysi Cunningham    SCHEDULING INFORMATION:                                                                                         .    Surgeon's Scheduling Instruction:  elective    RN Post-op Appt:  [x] Yes   [] No  Preferred Thursday:   [] Yes   [] No    Requested Date:    OR Time:   Patient Arrival Time:    OR Time Required:  15  Minutes  Anesthesia:  MAC/TIVA  Equipment:  None  Mini C-Arm:  No   Standard C-Arm:  No  Status:  outpatient  PAT Required:  Yes  Comments: PT WILL CALL                     Latanya Palma MD  8/3/22 2:00 PM    BILLING INFORMATION:                                                                                                    .    Procedure:       CPT Skin check done with Cynthia SANDRA   Code Modifier  right Carpal Tunnel Release and Right Index Finger and Small Finger Trigger Finger Release      .                                        Pre Operative Physician Prophylaxis Orders - SCIP Protocols      Pre-Operative Antibiotic Order:    Allergies   Allergen Reactions    Codeine Palpitations and Other (See Comments)     \"Passed out, ended up in ICU\"    Coreg [Carvedilol]      Fatigue     Imdur [Isosorbide Nitrate] Other (See Comments)     headache          [x]  ----  No Antibiotic Ordered       []  ----  Give the following Antibiotic within 1 hour prior to start time:         Ancef 1 gram IV if patient is less than 200 pounds    or       Ancef 2 grams IV if patient is greater than 200 pounds    or      Vancomycin 1 gram IV (over 1 hour) if patient is allergic to           PENICILLINS or CEFALOSPORINS            Procedure: right Carpal Tunnel Release and Right Index Finger and Small Finger Trigger Finger Release     Patient: Juan Luis Main  :    1960    Physician Signature:     Date: 8/3/22  Time: 2:00 PM

## 2025-02-05 ENCOUNTER — OFFICE VISIT (OUTPATIENT)
Dept: CARDIOLOGY CLINIC | Age: 65
End: 2025-02-05
Payer: COMMERCIAL

## 2025-02-05 VITALS
WEIGHT: 158.6 LBS | SYSTOLIC BLOOD PRESSURE: 132 MMHG | OXYGEN SATURATION: 97 % | HEIGHT: 67 IN | DIASTOLIC BLOOD PRESSURE: 80 MMHG | HEART RATE: 66 BPM | BODY MASS INDEX: 24.89 KG/M2

## 2025-02-05 DIAGNOSIS — Z87.891 HISTORY OF TOBACCO ABUSE: ICD-10-CM

## 2025-02-05 DIAGNOSIS — I25.10 CORONARY ARTERY DISEASE INVOLVING NATIVE CORONARY ARTERY OF NATIVE HEART WITHOUT ANGINA PECTORIS: ICD-10-CM

## 2025-02-05 DIAGNOSIS — Z79.899 MEDICATION MANAGEMENT: ICD-10-CM

## 2025-02-05 DIAGNOSIS — R94.31 ABNORMAL ELECTROCARDIOGRAPHY: ICD-10-CM

## 2025-02-05 DIAGNOSIS — I10 ESSENTIAL HYPERTENSION: ICD-10-CM

## 2025-02-05 DIAGNOSIS — R07.9 CHEST PAIN, UNSPECIFIED TYPE: ICD-10-CM

## 2025-02-05 DIAGNOSIS — R06.02 SHORTNESS OF BREATH: ICD-10-CM

## 2025-02-05 DIAGNOSIS — I49.9 IRREGULAR HEART RATE: Primary | ICD-10-CM

## 2025-02-05 DIAGNOSIS — E78.2 MIXED HYPERLIPIDEMIA: ICD-10-CM

## 2025-02-05 DIAGNOSIS — R00.2 PALPITATIONS: ICD-10-CM

## 2025-02-05 PROCEDURE — 93000 ELECTROCARDIOGRAM COMPLETE: CPT | Performed by: INTERNAL MEDICINE

## 2025-02-05 PROCEDURE — 3075F SYST BP GE 130 - 139MM HG: CPT | Performed by: INTERNAL MEDICINE

## 2025-02-05 PROCEDURE — 3079F DIAST BP 80-89 MM HG: CPT | Performed by: INTERNAL MEDICINE

## 2025-02-05 PROCEDURE — 99214 OFFICE O/P EST MOD 30 MIN: CPT | Performed by: INTERNAL MEDICINE

## 2025-02-05 RX ORDER — AMLODIPINE BESYLATE 5 MG/1
5 TABLET ORAL DAILY
Qty: 90 TABLET | Refills: 3 | Status: CANCELLED | OUTPATIENT
Start: 2025-02-05

## 2025-02-05 RX ORDER — LISINOPRIL 10 MG/1
10 TABLET ORAL DAILY
Qty: 90 TABLET | Refills: 3 | Status: SHIPPED | OUTPATIENT
Start: 2025-02-05

## 2025-02-05 RX ORDER — NITROGLYCERIN 0.4 MG/1
0.4 TABLET SUBLINGUAL EVERY 5 MIN PRN
Qty: 25 TABLET | Refills: 5 | Status: SHIPPED | OUTPATIENT
Start: 2025-02-05

## 2025-02-05 RX ORDER — ATORVASTATIN CALCIUM 80 MG/1
80 TABLET, FILM COATED ORAL DAILY
Qty: 90 TABLET | Refills: 3 | Status: SHIPPED | OUTPATIENT
Start: 2025-02-05

## 2025-02-05 RX ORDER — CLOPIDOGREL BISULFATE 75 MG/1
75 TABLET ORAL DAILY
Qty: 90 TABLET | Refills: 3 | Status: SHIPPED | OUTPATIENT
Start: 2025-02-05

## 2025-02-05 NOTE — PATIENT INSTRUCTIONS
Plan:  Labs reviewed in epic and discussed with patient.   Current medications reviewed.  Refills given as warranted.  It is ok to continue to not take amlodipine since your bp is well controlled at this time.    I will personally review your tests and then I will have my staff call you with the results.   Recommend wearing a heart monitor for 2 weeks.  Call [phone] to schedule having it placed.  We place monitors at our [facility] Office, [address].   Call [phone] to schedule An Echocardiogram   - Looks at the size and strength of your heart  - This test is an ultrasound of your heart just like when you see an ultrasound that a woman has when she is pregnant.  - The test records the movement of your heart valves and chambers.  - It evaluates heart valves, chamber enlargement, abnormal openings, or any fluid in the sac surrounding the heart.    Call [phone] to schedule a Chemical Stress Test   - test will look for blockages and the blood flow through the heart  - A small IV will be placed into your arm to administer a radioisotope (myoview) to visualize your heart..   - You will then have a waiting period to allow the tracer to be absorbed by the heart muscle. After the waiting period, we will take pictures of the blood flow to your heart muscle with the nuclear camera.   - Following your pictures, we will perform your stress test. We can do your stress test by giving you a medication (Lexiscan) which makes your body think it is exercising.   - We will monitor you before, during, and after your stress test to make sure you are safe and comfortable.  Stress Test instructions  - Allow 3-4 hours for the entire test to be complete.   - Nothing to eat or drink except water after midnight or 8 hours prior to your test. If you are scheduled in the afternoon, you are permitted to eat a light breakfast such as a bowl of cereal or toast.   - Do not drink or eat anything containing caffeine 24 hours prior
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
## 2025-02-10 ENCOUNTER — ANCILLARY PROCEDURE (OUTPATIENT)
Dept: CARDIOLOGY CLINIC | Age: 65
End: 2025-02-10

## 2025-02-10 ENCOUNTER — TELEPHONE (OUTPATIENT)
Dept: CARDIOLOGY CLINIC | Age: 65
End: 2025-02-10

## 2025-02-10 DIAGNOSIS — R00.2 PALPITATIONS: ICD-10-CM

## 2025-02-10 NOTE — TELEPHONE ENCOUNTER
Monitor placed by Luisa ZAYAS  Monitor company [employer]  Length of monitor 14 day  Monitor ordered by Tom  Serial number [id number]  Kit ID [id number], [id number]  Activation successful prior to pt leaving office? Yes

## 2025-02-14 ENCOUNTER — TELEPHONE (OUTPATIENT)
Dept: CARDIOLOGY CLINIC | Age: 65
End: 2025-02-14

## 2025-02-14 NOTE — TELEPHONE ENCOUNTER
Patient informed.  He noted that he did not feel well and was lying down.  I encouraged him to go to ER that the monitor only shows rhythms of the heart and not if anything else was going on.  Rudy LAFLEUR.

## 2025-02-14 NOTE — TELEPHONE ENCOUNTER
Pt stated he has been having episodes of Heart fluttering and dizziness. Pt has been wearing a monitor and pressing the report symptoms on the [employer] phone. Pt was advised if symptoms worsen to go to the ER. Jase galarza/kaley RDZ

## 2025-02-14 NOTE — TELEPHONE ENCOUNTER
No alerts sent from  but I did print out some strips when he reported symptoms.  They are attached to this message and scanned to media.

## 2025-02-18 ENCOUNTER — OFFICE VISIT (OUTPATIENT)
Dept: FAMILY MEDICINE CLINIC | Age: 65
End: 2025-02-18
Payer: COMMERCIAL

## 2025-02-18 VITALS
HEART RATE: 57 BPM | DIASTOLIC BLOOD PRESSURE: 82 MMHG | WEIGHT: 161 LBS | SYSTOLIC BLOOD PRESSURE: 122 MMHG | BODY MASS INDEX: 25.21 KG/M2 | OXYGEN SATURATION: 98 % | TEMPERATURE: 97.6 F

## 2025-02-18 DIAGNOSIS — M25.572 CHRONIC PAIN OF BOTH ANKLES: ICD-10-CM

## 2025-02-18 DIAGNOSIS — Z12.5 SCREENING PSA (PROSTATE SPECIFIC ANTIGEN): ICD-10-CM

## 2025-02-18 DIAGNOSIS — K21.00 GASTROESOPHAGEAL REFLUX DISEASE WITH ESOPHAGITIS WITHOUT HEMORRHAGE: ICD-10-CM

## 2025-02-18 DIAGNOSIS — M25.571 CHRONIC PAIN OF BOTH ANKLES: ICD-10-CM

## 2025-02-18 DIAGNOSIS — H93.13 TINNITUS OF BOTH EARS: Primary | ICD-10-CM

## 2025-02-18 DIAGNOSIS — Z87.891 PERSONAL HISTORY OF TOBACCO USE: ICD-10-CM

## 2025-02-18 DIAGNOSIS — L82.1 SEBORRHEIC KERATOSIS: ICD-10-CM

## 2025-02-18 DIAGNOSIS — J43.9 MILD EMPHYSEMA (HCC): ICD-10-CM

## 2025-02-18 DIAGNOSIS — G89.29 CHRONIC PAIN OF BOTH ANKLES: ICD-10-CM

## 2025-02-18 PROCEDURE — 3079F DIAST BP 80-89 MM HG: CPT | Performed by: NURSE PRACTITIONER

## 2025-02-18 PROCEDURE — 99214 OFFICE O/P EST MOD 30 MIN: CPT | Performed by: NURSE PRACTITIONER

## 2025-02-18 PROCEDURE — 3074F SYST BP LT 130 MM HG: CPT | Performed by: NURSE PRACTITIONER

## 2025-02-18 PROCEDURE — G0296 VISIT TO DETERM LDCT ELIG: HCPCS | Performed by: NURSE PRACTITIONER

## 2025-02-18 RX ORDER — PANTOPRAZOLE SODIUM 40 MG/1
40 TABLET, DELAYED RELEASE ORAL DAILY
Qty: 90 TABLET | Refills: 3 | Status: SHIPPED | OUTPATIENT
Start: 2025-02-18

## 2025-02-18 SDOH — ECONOMIC STABILITY: FOOD INSECURITY: WITHIN THE PAST 12 MONTHS, YOU WORRIED THAT YOUR FOOD WOULD RUN OUT BEFORE YOU GOT MONEY TO BUY MORE.: NEVER TRUE

## 2025-02-18 SDOH — ECONOMIC STABILITY: FOOD INSECURITY: WITHIN THE PAST 12 MONTHS, THE FOOD YOU BOUGHT JUST DIDN'T LAST AND YOU DIDN'T HAVE MONEY TO GET MORE.: NEVER TRUE

## 2025-02-18 ASSESSMENT — PATIENT HEALTH QUESTIONNAIRE - PHQ9
SUM OF ALL RESPONSES TO PHQ QUESTIONS 1-9: 0
SUM OF ALL RESPONSES TO PHQ9 QUESTIONS 1 & 2: 0
1. LITTLE INTEREST OR PLEASURE IN DOING THINGS: NOT AT ALL
2. FEELING DOWN, DEPRESSED OR HOPELESS: NOT AT ALL
SUM OF ALL RESPONSES TO PHQ QUESTIONS 1-9: 0

## 2025-02-18 NOTE — PROGRESS NOTES
for high blood pressure, hyperlipidemia, coronary artery disease, heartburn, and tinnitus.    He has a long-standing history of tinnitus, predominantly affecting his right ear. 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 He reports difficulty hearing and prefers not to use hearing aids. He has tried over-the-counter hearing aids without success. He was prescribed steroids for influenza last year, which temporarily alleviated his tinnitus.    He has a history of myocardial infarction in 2018, which prompted him to cease smoking. He recently consulted with Dr. Santos, who ordered a stress test, echocardiogram, and initiated cardiac monitoring. His follow-up appointment with Dr. Rivera is scheduled for 06/09/2025. He reports experiencing palpitations and dyspnea, although these symptoms have improved compared to the previous week. He also reports a decrease in chest pain and fatigue since recovering from influenza. He is uncertain if these symptoms are related to his recent influenza infection. He has been informed that ibuprofen may exacerbate his cardiac condition, so he attempts to limit its use.     Concerned about worsening shortness of breath.  He has not been informed of any emphysema diagnosis. He reports difficulty breathing, particularly when exerting himself. He has not had a pulmonary function test since 2010. He is interested in lung cancer screening. He is concerned about his lung health due to his family history of cancer.     Reports chronic bilateral ankle pain.  However, he anticipates needing it today due to his ankle pain. He has not tried any over-the-counter supplements for his pain. Prior Podiatry evaluation.  Prior HEP with mild improvement.      He has a history of gastroesophageal reflux disease. He occasionally takes pantoprazole and Prilosec, but reports that Prilosec is not effective for him.    He has a black mole on his foot that has been present for 30 years without

## 2025-02-18 NOTE — PATIENT INSTRUCTIONS
Dear Alfonso,    Thank you for coming in.  We appreciate your time and effort in helping us with your care.  Here are some follow up items following our discussion:    SUPPLEMENT SHEET  [employer]aturals Ultimate Omega with Lemon (FISH OIL) 1-2 capsules/day  Co-Enzyme Q10 - 100-200mg/day  Turmeric/Curcumin- 1-2 tablets/day  Magnesium- 400mg/day  Vitamin D3- 1783-1579 units/day  Glucosamine Chondroitin (Osteo Biflex)/MSM  CBD cream   Diclofenac Gel 1% (Voltaren Gel) - 2x per day- rub into area well for 60-90 seconds- wash hands well after each use     AVOID 90% OF THE TIME!!  DAIRY- Examples: Milk, Cheese, Ice cream   SUGAR- Examples: Cookies, Cakes, Pies, Frosting, Soda etc.       Please compare this printed medication list with your medications at home to be sure they are the same.  If you have any medications that are different please contact us immediately at [phone].  Or you can send us a Qompium message.      If you need to schedule imaging or testing you can call [facility]'s Main Scheduling Line at [phone], option 2    Also review your allergies that we have listed, these may also include medications that you have not been able to tolerate, make sure everything listed is correct. If you have any allergies that are different please contact us immediately at [phone].    You may receive a survey in the mail or by email asking about your experience during your visit today. Please complete and return to us so we know how we are serving you.         Learning About Lung Cancer Screening  What is screening for lung cancer?     Lung cancer screening is a way to find some lung cancers early, before a person has any symptoms of the cancer.  Lung cancer screening may help those who have the highest risk for lung cancer--people age 50 and older who are or were heavy smokers. For most people, who aren't at increased risk, screening for lung cancer probably isn't helpful.  Screening won't prevent cancer. And it may

## 2025-02-20 ENCOUNTER — HOSPITAL ENCOUNTER (OUTPATIENT)
Age: 65
Discharge: HOME | End: 2025-02-20
Payer: COMMERCIAL

## 2025-02-20 ENCOUNTER — HOSPITAL ENCOUNTER (OUTPATIENT)
Age: 65
Discharge: HOME | End: 2025-02-22
Attending: INTERNAL MEDICINE
Payer: COMMERCIAL

## 2025-02-20 ENCOUNTER — HOSPITAL ENCOUNTER (OUTPATIENT)
Dept: NUCLEAR MEDICINE | Age: 65
Discharge: HOME | End: 2025-02-20
Attending: INTERNAL MEDICINE
Payer: COMMERCIAL

## 2025-02-20 VITALS — HEIGHT: 67 IN | WEIGHT: 161 LBS | BODY MASS INDEX: 25.27 KG/M2

## 2025-02-20 DIAGNOSIS — R94.31 ABNORMAL ELECTROCARDIOGRAPHY: ICD-10-CM

## 2025-02-20 DIAGNOSIS — Z12.5 SCREENING PSA (PROSTATE SPECIFIC ANTIGEN): ICD-10-CM

## 2025-02-20 DIAGNOSIS — R06.02 SHORTNESS OF BREATH: ICD-10-CM

## 2025-02-20 DIAGNOSIS — Z79.899 MEDICATION MANAGEMENT: ICD-10-CM

## 2025-02-20 DIAGNOSIS — R07.9 CHEST PAIN, UNSPECIFIED TYPE: ICD-10-CM

## 2025-02-20 LAB
ALBUMIN SERPL-MCNC: 4.5 G/DL (ref 3.4–5)
ALBUMIN/GLOB SERPL: 1.4 {RATIO} (ref 1.1–2.2)
ALP SERPL-CCNC: 83 U/L (ref 40–129)
ALT SERPL-CCNC: 46 U/L (ref 10–40)
ANION GAP SERPL CALCULATED.3IONS-SCNC: 8 MMOL/L (ref 3–16)
AST SERPL-CCNC: 33 U/L (ref 15–37)
BASOPHILS # BLD: 0 K/UL (ref 0–0.2)
BASOPHILS NFR BLD: 0.7 %
BILIRUB SERPL-MCNC: 0.4 MG/DL (ref 0–1)
BUN SERPL-MCNC: 19 MG/DL (ref 7–20)
CALCIUM SERPL-MCNC: 9.4 MG/DL (ref 8.3–10.6)
CHLORIDE SERPL-SCNC: 102 MMOL/L (ref 99–110)
CHOLEST SERPL-MCNC: 206 MG/DL (ref 0–199)
CO2 SERPL-SCNC: 28 MMOL/L (ref 21–32)
CREAT SERPL-MCNC: 1.2 MG/DL (ref 0.8–1.3)
DEPRECATED RDW RBC AUTO: 13.6 % (ref 12.4–15.4)
ECHO BSA: 1.86 M2
EOSINOPHIL # BLD: 0.2 K/UL (ref 0–0.6)
EOSINOPHIL NFR BLD: 5.1 %
GFR SERPLBLD CREATININE-BSD FMLA CKD-EPI: 68 ML/MIN/{1.73_M2}
GLUCOSE SERPL-MCNC: 110 MG/DL (ref 70–99)
HCT VFR BLD AUTO: 43.5 % (ref 40.5–52.5)
HDLC SERPL-MCNC: 64 MG/DL (ref 40–60)
HGB BLD-MCNC: 14.8 G/DL (ref 13.5–17.5)
LDLC SERPL CALC-MCNC: 126 MG/DL
LYMPHOCYTES # BLD: 1.1 K/UL (ref 1–5.1)
LYMPHOCYTES NFR BLD: 24.2 %
MCH RBC QN AUTO: 29.9 PG (ref 26–34)
MCHC RBC AUTO-ENTMCNC: 34 G/DL (ref 31–36)
MCV RBC AUTO: 88.1 FL (ref 80–100)
MONOCYTES # BLD: 0.4 K/UL (ref 0–1.3)
MONOCYTES NFR BLD: 8.4 %
NEUTROPHILS # BLD: 2.8 K/UL (ref 1.7–7.7)
NEUTROPHILS NFR BLD: 61.6 %
NUC STRESS EJECTION FRACTION: 74 %
NUC STRESS LV EDV: 103 ML (ref 67–155)
NUC STRESS LV ESV: 27 ML (ref 22–58)
NUC STRESS LV MASS: 134 G
PLATELET # BLD AUTO: 173 K/UL (ref 135–450)
PMV BLD AUTO: 9.5 FL (ref 5–10.5)
POTASSIUM SERPL-SCNC: 4.4 MMOL/L (ref 3.5–5.1)
PROT SERPL-MCNC: 7.8 G/DL (ref 6.4–8.2)
RBC # BLD AUTO: 4.94 M/UL (ref 4.2–5.9)
SODIUM SERPL-SCNC: 138 MMOL/L (ref 136–145)
STRESS BASELINE DIAS BP: 79 MMHG
STRESS BASELINE HR: 62 BPM
STRESS BASELINE SYS BP: 143 MMHG
STRESS ESTIMATED WORKLOAD: 1 METS
STRESS PEAK DIAS BP: 74 MMHG
STRESS PEAK SYS BP: 147 MMHG
STRESS PERCENT HR ACHIEVED: 60 %
STRESS POST PEAK HR: 93 BPM
STRESS RATE PRESSURE PRODUCT: NORMAL BPM*MMHG
STRESS TARGET HR: 156 BPM
TRIGL SERPL-MCNC: 79 MG/DL (ref 0–150)
TSH SERPL DL<=0.005 MIU/L-ACNC: 2.72 UIU/ML (ref 0.27–4.2)
VLDLC SERPL CALC-MCNC: 16 MG/DL
WBC # BLD AUTO: 4.5 K/UL (ref 4–11)

## 2025-02-20 PROCEDURE — 85025 COMPLETE CBC W/AUTO DIFF WBC: CPT

## 2025-02-20 PROCEDURE — 84443 ASSAY THYROID STIM HORMONE: CPT

## 2025-02-20 PROCEDURE — A9502 TC99M TETROFOSMIN: HCPCS | Performed by: INTERNAL MEDICINE

## 2025-02-20 PROCEDURE — 36415 COLL VENOUS BLD VENIPUNCTURE: CPT

## 2025-02-20 PROCEDURE — 78452 HT MUSCLE IMAGE SPECT MULT: CPT

## 2025-02-20 PROCEDURE — 6360000002 HC RX W HCPCS: Performed by: INTERNAL MEDICINE

## 2025-02-20 PROCEDURE — 80061 LIPID PANEL: CPT

## 2025-02-20 PROCEDURE — 3430000000 HC RX DIAGNOSTIC RADIOPHARMACEUTICAL: Performed by: INTERNAL MEDICINE

## 2025-02-20 PROCEDURE — 80053 COMPREHEN METABOLIC PANEL: CPT

## 2025-02-20 PROCEDURE — 93017 CV STRESS TEST TRACING ONLY: CPT

## 2025-02-20 RX ORDER — REGADENOSON 0.08 MG/ML
0.4 INJECTION, SOLUTION INTRAVENOUS
Status: COMPLETED | OUTPATIENT
Start: 2025-02-20 | End: 2025-02-20

## 2025-02-20 RX ADMIN — TETROFOSMIN 11.7 MILLICURIE: 1.38 INJECTION, POWDER, LYOPHILIZED, FOR SOLUTION INTRAVENOUS at 09:21

## 2025-02-20 RX ADMIN — REGADENOSON 0.4 MG: 0.08 INJECTION, SOLUTION INTRAVENOUS at 11:20

## 2025-02-20 RX ADMIN — TETROFOSMIN 32.5 MILLICURIE: 1.38 INJECTION, POWDER, LYOPHILIZED, FOR SOLUTION INTRAVENOUS at 11:20

## 2025-02-21 ENCOUNTER — TELEPHONE (OUTPATIENT)
Dept: CARDIOLOGY CLINIC | Age: 65
End: 2025-02-21

## 2025-02-21 RX ORDER — EZETIMIBE 10 MG/1
10 TABLET ORAL DAILY
Qty: 90 TABLET | Refills: 1 | Status: SHIPPED | OUTPATIENT
Start: 2025-02-21

## 2025-02-21 NOTE — RESULT ENCOUNTER NOTE
Spoke with patient regarding results.  All questions answered.  Patient verbalized understanding.     Script sent for 90 days to Ashish.

## 2025-02-21 NOTE — TELEPHONE ENCOUNTER
I reviewed nuc stress study and normal without evidence for significant coronary artery blockage. Good news. Yes, he should still get ECHO to see if any weakening of heart muscle strength to explain SOB and CP symptoms. I recommend getting ECHO and continuing monitor. I will review results when available. Thanks.

## 2025-02-21 NOTE — RESULT ENCOUNTER NOTE
Bebeto Edmond MD  [facility] Staff  Cholesterol level is not at goal  Please order zetia 10mg daily  continue high dose atorvastatin

## 2025-02-21 NOTE — RESULT ENCOUNTER NOTE
Bebeto Edmond MD  P [facility] Cardio Practice Staff  Stress test looks good normal blood flow in arteries and normal heart function.

## 2025-02-21 NOTE — TELEPHONE ENCOUNTER
Pt called office asking if he still needs to get his echo completed. Pt states he still has his monitor on and his stress test came back normal. SMM please advise.

## 2025-06-24 ENCOUNTER — OFFICE VISIT (OUTPATIENT)
Dept: FAMILY MEDICINE CLINIC | Age: 65
End: 2025-06-24
Payer: COMMERCIAL

## 2025-06-24 ENCOUNTER — HOSPITAL ENCOUNTER (OUTPATIENT)
Dept: GENERAL RADIOLOGY | Age: 65
Discharge: HOME | End: 2025-06-24
Payer: COMMERCIAL

## 2025-06-24 VITALS
SYSTOLIC BLOOD PRESSURE: 138 MMHG | WEIGHT: 164 LBS | DIASTOLIC BLOOD PRESSURE: 88 MMHG | OXYGEN SATURATION: 98 % | HEART RATE: 83 BPM | TEMPERATURE: 97.1 F | BODY MASS INDEX: 25.69 KG/M2

## 2025-06-24 DIAGNOSIS — G89.29 CHRONIC RIGHT SHOULDER PAIN: ICD-10-CM

## 2025-06-24 DIAGNOSIS — M25.511 CHRONIC RIGHT SHOULDER PAIN: ICD-10-CM

## 2025-06-24 DIAGNOSIS — M25.511 ACUTE PAIN OF RIGHT SHOULDER: Primary | ICD-10-CM

## 2025-06-24 DIAGNOSIS — R20.2 PARESTHESIAS IN RIGHT HAND: ICD-10-CM

## 2025-06-24 PROCEDURE — 73030 X-RAY EXAM OF SHOULDER: CPT

## 2025-06-24 PROCEDURE — 3075F SYST BP GE 130 - 139MM HG: CPT | Performed by: NURSE PRACTITIONER

## 2025-06-24 PROCEDURE — 3079F DIAST BP 80-89 MM HG: CPT | Performed by: NURSE PRACTITIONER

## 2025-06-24 PROCEDURE — 99213 OFFICE O/P EST LOW 20 MIN: CPT | Performed by: NURSE PRACTITIONER

## 2025-06-24 RX ORDER — PREDNISONE 20 MG/1
20 TABLET ORAL 2 TIMES DAILY
Qty: 10 TABLET | Refills: 0 | Status: SHIPPED | OUTPATIENT
Start: 2025-06-24 | End: 2025-06-29

## 2025-06-24 ASSESSMENT — ENCOUNTER SYMPTOMS
SHORTNESS OF BREATH: 0
COUGH: 0
BLOOD IN STOOL: 0
CONSTIPATION: 0
CHEST TIGHTNESS: 0
DIARRHEA: 0
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
## 2025-06-30 ENCOUNTER — RESULTS FOLLOW-UP (OUTPATIENT)
Dept: FAMILY MEDICINE CLINIC | Age: 65
End: 2025-06-30

## 2025-07-03 ENCOUNTER — HOSPITAL ENCOUNTER (OUTPATIENT)
Dept: MRI IMAGING | Age: 65
Discharge: HOME | End: 2025-07-03
Payer: COMMERCIAL

## 2025-07-03 DIAGNOSIS — M25.511 ACUTE PAIN OF RIGHT SHOULDER: ICD-10-CM

## 2025-07-03 DIAGNOSIS — G89.29 CHRONIC RIGHT SHOULDER PAIN: ICD-10-CM

## 2025-07-03 DIAGNOSIS — M25.511 CHRONIC RIGHT SHOULDER PAIN: ICD-10-CM

## 2025-07-03 PROCEDURE — 73221 MRI JOINT UPR EXTREM W/O DYE: CPT

## 2025-07-08 ENCOUNTER — TELEPHONE (OUTPATIENT)
Dept: FAMILY MEDICINE CLINIC | Age: 65
End: 2025-07-08

## 2025-07-08 DIAGNOSIS — M25.511 CHRONIC RIGHT SHOULDER PAIN: Primary | ICD-10-CM

## 2025-07-08 DIAGNOSIS — G89.29 CHRONIC RIGHT SHOULDER PAIN: Primary | ICD-10-CM

## 2025-07-08 NOTE — TELEPHONE ENCOUNTER
Patient had an mri of rt shoulder on 7/3/25. He is asking for results and asking if he needs to see an orthopaedic.

## 2025-07-08 NOTE — TELEPHONE ENCOUNTER
Appears to have multiple tears of rotator cuff.  Also has tendinitis and tendinosis.  Please refer to Edith

## 2025-07-09 NOTE — TELEPHONE ENCOUNTER
Pt called back and was given the results and states he already called ortho and has an appt tomorrow

## 2025-07-10 ENCOUNTER — OFFICE VISIT (OUTPATIENT)
Dept: ORTHOPEDIC SURGERY | Age: 65
End: 2025-07-10
Payer: COMMERCIAL

## 2025-07-10 VITALS — BODY MASS INDEX: 25.74 KG/M2 | HEIGHT: 67 IN | WEIGHT: 164 LBS

## 2025-07-10 DIAGNOSIS — S46.011A TRAUMATIC COMPLETE TEAR OF RIGHT ROTATOR CUFF, INITIAL ENCOUNTER: ICD-10-CM

## 2025-07-10 DIAGNOSIS — M25.511 RIGHT SHOULDER PAIN, UNSPECIFIED CHRONICITY: Primary | ICD-10-CM

## 2025-07-10 PROCEDURE — L3670 SO ACRO/CLAV CAN WEB PRE OTS: HCPCS | Performed by: STUDENT IN AN ORGANIZED HEALTH CARE EDUCATION/TRAINING PROGRAM

## 2025-07-10 PROCEDURE — 99214 OFFICE O/P EST MOD 30 MIN: CPT | Performed by: STUDENT IN AN ORGANIZED HEALTH CARE EDUCATION/TRAINING PROGRAM

## 2025-07-10 NOTE — PROGRESS NOTES
Date:  7/10/2025    Name:  Alfonso Newell  Address:  [address] [place] [address]  [facility] [address]    :  1960      Age:   65 y.o.    SSN:  [id number]      Medical Record Number:  [id number]    Reason for Visit:    Chief Complaint    Shoulder Pain (Right shoulder)      DOS:7/10/2025     HPI: Alfonso Newell is a 65 y.o. male here today for new patient evaluation regarding his right shoulder    History of Present Illness  The patient is a 65-year-old male here today for the evaluation regarding his right shoulder. He underwent rotator cuff repair surgery in , which included work on the bicep muscle, rotator cuff, ball, and ligament. Post-surgery, he was able to resume activities such as golf and baseball without any issues. However, in , he experienced a fall from a truck, during which his arm extended over his head. This incident led to persistent discomfort in his shoulder.  Over the past few months his pain is gotten worse.  An MRI scan was performed, but no abnormalities were detected.     He reports ongoing pain in the shoulder, which he has been managing with IcyHot and has found some relief from a course of steroids. 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    PAST SURGICAL HISTORY:  Rotator cuff repair surgery in .    SOCIAL HISTORY  Occupations: Works with countertops  Exercise: Plays golf           ROS: All systems reviewed on patient intake form.  Pertinent items are noted in HPI.        Past Medical History:   Diagnosis Date    CAD (coronary artery disease)     Hyperlipidemia     Hypertension         Past Surgical History:   Procedure Laterality Date    CARDIAC SURGERY      stent x1    DIAGNOSTIC CARDIAC CATH LAB PROCEDURE  2018    FOOT SURGERY      MANDIBLE FRACTURE SURGERY      PTCA  2018    SHOULDER ARTHROSCOPY      R shoulder and rotator cuff       Family History   Problem Relation Age of

## 2025-08-14 RX ORDER — EZETIMIBE 10 MG/1
10 TABLET ORAL DAILY
Qty: 90 TABLET | Refills: 2 | Status: SHIPPED | OUTPATIENT
Start: 2025-08-14

## 2025-08-15 DIAGNOSIS — S20.219A CONTUSION OF CHEST WALL, UNSPECIFIED LATERALITY, INITIAL ENCOUNTER: Primary | ICD-10-CM

## 2025-08-15 RX ORDER — HYDROCODONE BITARTRATE AND ACETAMINOPHEN 5; 325 MG/1; MG/1
1 TABLET ORAL EVERY 8 HOURS PRN
Qty: 9 TABLET | Refills: 0 | Status: SHIPPED | OUTPATIENT
Start: 2025-08-15 | End: 2025-08-18

## 2025-08-15 RX ORDER — HYDROCODONE BITARTRATE AND ACETAMINOPHEN 5; 325 MG/1; MG/1
1 TABLET ORAL EVERY 8 HOURS PRN
Qty: 9 TABLET | Refills: 0 | Status: SHIPPED | OUTPATIENT
Start: 2025-08-15 | End: 2025-08-15

## 2025-08-25 ENCOUNTER — TELEPHONE (OUTPATIENT)
Dept: FAMILY MEDICINE CLINIC | Age: 65
End: 2025-08-25

## 2025-08-25 ENCOUNTER — HOSPITAL ENCOUNTER (OUTPATIENT)
Dept: GENERAL RADIOLOGY | Age: 65
Discharge: HOME | End: 2025-08-25
Payer: COMMERCIAL

## 2025-08-25 DIAGNOSIS — R06.02 SHORTNESS OF BREATH: Primary | ICD-10-CM

## 2025-08-25 DIAGNOSIS — R06.02 SHORTNESS OF BREATH: ICD-10-CM

## 2025-08-25 PROCEDURE — 71046 X-RAY EXAM CHEST 2 VIEWS: CPT
